# Patient Record
Sex: MALE | NOT HISPANIC OR LATINO | Employment: OTHER | URBAN - METROPOLITAN AREA
[De-identification: names, ages, dates, MRNs, and addresses within clinical notes are randomized per-mention and may not be internally consistent; named-entity substitution may affect disease eponyms.]

---

## 2017-01-03 ENCOUNTER — TELEPHONE (OUTPATIENT)
Dept: PALLIATIVE MEDICINE | Facility: CLINIC | Age: 27
End: 2017-01-03

## 2017-01-03 NOTE — TELEPHONE ENCOUNTER
Left message that we were calling for an update about how s/he was doing after the injection.  LM that if s/he has any problems or questions to call the nurse line at [phone].

## 2017-01-12 ENCOUNTER — RADIANT APPOINTMENT (OUTPATIENT)
Dept: RADIOLOGY | Facility: CLINIC | Age: 27
End: 2017-01-12
Attending: ANESTHESIOLOGY
Payer: OTHER MISCELLANEOUS

## 2017-01-12 ENCOUNTER — RADIOLOGY INJECTION OFFICE VISIT (OUTPATIENT)
Dept: PALLIATIVE MEDICINE | Facility: CLINIC | Age: 27
End: 2017-01-12
Payer: OTHER MISCELLANEOUS

## 2017-01-12 VITALS — SYSTOLIC BLOOD PRESSURE: 121 MMHG | HEART RATE: 91 BPM | OXYGEN SATURATION: 97 % | DIASTOLIC BLOOD PRESSURE: 73 MMHG

## 2017-01-12 DIAGNOSIS — G90.511 COMPLEX REGIONAL PAIN SYNDROME TYPE 1 OF RIGHT UPPER EXTREMITY: ICD-10-CM

## 2017-01-12 DIAGNOSIS — G90.521 COMPLEX REGIONAL PAIN SYNDROME TYPE 1 OF RIGHT LOWER EXTREMITY: Primary | ICD-10-CM

## 2017-01-12 PROCEDURE — 64510 N BLOCK STELLATE GANGLION: CPT | Mod: RT | Performed by: ANESTHESIOLOGY

## 2017-01-12 ASSESSMENT — PAIN SCALES - GENERAL: PAINLEVEL: EXTREME PAIN (8)

## 2017-01-12 NOTE — PROGRESS NOTES
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  DAYANNA Lazo referred him for a right stellate ganglion block. Options, benefits and risks were discussed with the patient including bleeding, infection, hoarseness, droopy face/eye, swallowing problems, no pain relief, seizure, stroke, paralysis, nerve injury, spinal cord injury, spinal headache, spinal fluid leak, coma, death. Questions were answered to his satisfaction and he agrees to proceed. Voluntary informed consent was obtained and signed.  The patient's medical history, medications, and allergies were reviewed and reconciled.    Vitals:  /69 mmHg  Pulse 88  Allergies reviewed:  Yes  Medications reviewed:  Yes  Pre-procedure pain score:  8/10    Examination revealed weakness and decreased light touch sensation in the median nerve distribution of the right hand.  Skin color, temperature, texture, and hair distribution were symmetric on the upper extremities and Allodynia was negative today.    PROCEDURE IN DETAIL:  After obtaining signed informed consent the patient was brought into the procedure suit and placed in a supine position on the procedure table. Patient's neck area was prepped and draped in the usual sterile fashion. The transverse process of C7 on the right side was identified under AP fluoroscopic guidance. Soft tissue of his neck were retracted using my left hand until palpation of transverse process was obtained. 1 ml of 1% lidocaine was injected at the needle entry point and needle tract using a 30 gauge 1 inch needle.     Then a 22 gauge 2 inch stellate needle was inserted and slowly  advanced under fluoroscopic guidance until bony contact was made. Lateral fluoroscopic guidance was also obtained to confirm the needle depth and position. After negative aspiration for heme and CSF, 1 ml of Omnipaque 300 contrast dye was injected. 9 ml of Omnipaque was wasted.  There was no vascular uptake, the contrast was spreading distally over the T1 vertebral body.  Then 10 ml of a combination of 5 ml of 0.2 % Ropivicaine and  5 ml of Lidocaine 2% was injected (total injectate volume 10 ml) slowly and incrementally with frequent intermittent aspirations. The needle was then flushed with lidocaine 1% as it was withdrawn, the patient's neck was cleansed and patient was brought to the recovery area.     In the recovery area patient reported 100% pain relief in the arm and hand at the time of discharge with a discharge pain score of 0/10 in the arm/hand.  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  Patient will continue monitoring progress and he was instructed to follow up with DAYANNA Lazo as scheduled.  The patient was then discharged in good condition with discharge instructions.    Follow-up includes:  -post-procedure nursing call at 1 week  -repeat stellate ganglion block in 2 weeks  -follow up in pain clinic as scheduled     Jayro Brand MD  [facility] [place]

## 2017-01-12 NOTE — NURSING NOTE
"Chief Complaint   Patient presents with     Pain       Initial /69 mmHg  Pulse 88 Estimated body mass index is 24.18 kg/(m^2) as calculated from the following:    Height as of 7/5/16: 1.77 m (5' 9.69\").    Weight as of 12/1/16: 75.751 kg (167 lb).  BP completed using cuff size: regular    Injection intake:    If this procedure is requiring IV sedation has patient been NPO for 6  Hours? NA    Is patient on coumadin, plavix or other prescribed blood thinner?   No    If patient is on coumadin was it held for 5 days?   NA    If patient is on plavix was it held for 7 days?    NA     Does patient take aspirin?  No    If this is for a cervical procedure and patient is on aspirin has it been held for 6 days?   NA    Any allergies to contrast dye, iodine, steroid and/or numbing medications?  NO    Is patient currently taking antibiotics or have an active infection?  NO    Does patient have a ? Yes       Is patient pregnant or breastfeeding?  Not Applicable    Are the vital signs normal?  Yes    Gwen Castro MA      "

## 2017-01-12 NOTE — MR AVS SNAPSHOT
After Visit Summary   1/12/2017    Denton Hines    MRN: [id number]           Patient Information     Date Of Birth          1990        Visit Information        Provider Department      1/12/2017 1:00 PM Jayro Johnson MD; [employer] [facility]        Care Instructions    [facility]   Procedure Discharge Instructions    Today you saw:    Dr. Jayro Brand     You had an:  Right Stellate ganglion block    Medications used:  Lidocaine   Bupivacaine   Dexamethasone Depo-medrol  Omnipaque  Ropivicaine   Kenalog   Omniscan   Normal saline    Stellate Ganglion Block:  You may have one or more of the following: difficulty swallowing, hoarseness, sore throat, mild swelling or bruising or the neck, drooping or redness of the eyelid on the side injected, a very small pupil on the side injected, weakness of the arm on the side injected.  These symptons should go away within 24 hours  If you experience chest pain, shortness of breath or problems breathing go to your local Emergency Room    Do not eat until the lump in your throat goes down (usually about 4 hours)  Do not drink hot drinks for the next 12 hours  You may take small sips of cool drinks or ice chips    You may have numbness in your upper extremity.  this may occur up to 6-8 hours after the procedure due to effect of the local anesthetic    Do not drive for 6 hours. The effect of the local anesthetic could slow your reflexes.     You may resume your regular activities after 24 hours    Avoid strenuous activity for the first 24 hours    You may shower, however avoid swimming, tub baths or hot tubs for 24 hours following your procedure    You may have a mild to moderate increase in pain for several days following the injection.    You may use ice packs for 10-15 minutes, 3 to 4 times a day at the injection site for comfort    Do not use heat to painful areas for 6 to 8 hours. This  will give the local anesthetic time to wear off and prevent you from accidentally burning your skin.     You may use anti-inflammatory medications (such as Ibuprofen or Aleve or Advil) or Tylenol for pain control if necessary    If you experience any of the following, call the pain center nursing line during work hours at [phone] or the after hours provider line at [phone]:  -Fever over 100 degree F  -Swelling, bleeding, redness, drainage, warmth at the injection site  -Progressive weakness or numbness in your legs or arms  -Loss of bowel or bladder function  -Unusual headache that is not relieved by Tylenol  -Unusual new onset of pain that is not improving      Phone #s:  Appointment line: [phone];  Nurse line: [phone]            Follow-ups after your visit        Your next 10 appointments already scheduled     Jan 16, 2017  1:15 PM   Return Visit with Lulu Fields, APRN CNP, [employer]   [facility] ([facility])    [address] [place] [address] [address]  [facility] [address] [phone]              Who to contact     If you have questions or need follow up information about today's clinic visit or your schedule please contact [facility] [place] [facility] directly at [phone].  Normal or non-critical lab and imaging results will be communicated to you by gestigonhart, letter or phone within 4 business days after the clinic has received the results. If you do not hear from us within 7 days, please contact the clinic through gestigonhart or phone. If you have a critical or abnormal lab result, we will notify you by phone as soon as possible.  Submit refill requests through [employer] or call your pharmacy and they will forward the refill request to us. Please allow 3 business days for your refill to be completed.          Additional Information About Your Visit        gestigon[employer] Information     [employer] gives you secure access to your electronic health record. If you  see a primary care provider, you can also send messages to your care team and make appointments. If you have questions, please call your primary care clinic.  If you do not have a primary care provider, please call [phone] and they will assist you.        Care EveryWhere ID     This is your Care EveryWhere ID. This could be used by other organizations to access your [place] medical records  [id number]        Your Vitals Were     Pulse Pulse Oximetry                91 97%           Blood Pressure from Last 3 Encounters:   01/12/17 121/73   12/27/16 134/78   12/01/16 161/99    Weight from Last 3 Encounters:   12/01/16 75.751 kg (167 lb)   11/03/16 73.936 kg (163 lb)   09/26/16 75.297 kg (166 lb)              Today, you had the following     No orders found for display       Primary Care Provider Office Phone # Fax #    [facility] [phone] [address] [facility] [address]        Thank you!     Thank you for choosing [facility]  for your care. Our goal is always to provide you with excellent care. Hearing back from our patients is one way we can continue to improve our services. Please take a few minutes to complete the written survey that you may receive in the mail after your visit with us. Thank you!             Your Updated Medication List - Protect others around you: Learn how to safely use, store and throw away your medicines at www.disposemymeds.org.          This list is accurate as of: 1/12/17  2:04 PM.  Always use your most recent med list.                   Brand Name Dispense Instructions for use    cyclobenzaprine 5 MG tablet    FLEXERIL    60 tablet    Take 1 tablet (5 mg) by mouth 3 times daily as needed for muscle spasms (If causes sedation just take at bedtime.)       IBUPROFEN PO      Take 1 tablet by mouth every 8 hours as needed for moderate pain       * lidocaine 5 % ointment    XYLOCAINE    100 g    Apply topically as needed for  moderate pain       * lidocaine 5 % Patch    LIDODERM    90 patch    1-3 patches daily as needed. Have patches on for 12 hours and off for 12 hours.       MAGNESIUM OXIDE PO          MULTIVITAMIN PO      Take by mouth daily       * order for DME     1 Units    Equipment being ordered: Neoprene or similar type of wrist/arm compression sleeve. Get largest size so it is not too tight.       * order for DME     1 Units    Equipment being ordered: TENS       * Notice:  This list has 4 medication(s) that are the same as other medications prescribed for you. Read the directions carefully, and ask your doctor or other care provider to review them with you.

## 2017-01-12 NOTE — NURSING NOTE
Discharge Information    IV Discontiued Time:  1408 catheter intact    Amount of Fluid Infused:  Saline locked    Discharge Criteria = When patient returns to baseline or as per MD order    Consciousness:  Pt is fully awake    Circulation:  BP +/- 20% of pre-procedure level    Respiration:  Patient is able to breathe deeply    O2 Sat:  Patient is able to maintain O2 Sat >92% on room air    Activity:  Moves 4 extremities on command    Ambulation:  Patient is able to stand and walk or stand and pivot into wheelchair    Dressing:  Clean/dry or No Dressing    Notes:   Discharge instructions and AVS given to patient    Patient meets criteria for discharge?  YES    Admitted to PCU?  No    Responsible adult present to accompany patient home?  Yes    Signature/Title:    andrés zee RN Care Coordinator  [facility]

## 2017-01-12 NOTE — PATIENT INSTRUCTIONS
[facility]   Procedure Discharge Instructions    Today you saw:    Dr. Jayro Brand     You had an:  Right Stellate ganglion block    Medications used:  Lidocaine   Bupivacaine   Dexamethasone Depo-medrol  Omnipaque  Ropivicaine   Kenalog   Omniscan   Normal saline    Stellate Ganglion Block:  You may have one or more of the following: difficulty swallowing, hoarseness, sore throat, mild swelling or bruising or the neck, drooping or redness of the eyelid on the side injected, a very small pupil on the side injected, weakness of the arm on the side injected.  These symptons should go away within 24 hours  If you experience chest pain, shortness of breath or problems breathing go to your local Emergency Room    Do not eat until the lump in your throat goes down (usually about 4 hours)  Do not drink hot drinks for the next 12 hours  You may take small sips of cool drinks or ice chips    You may have numbness in your upper extremity.  this may occur up to 6-8 hours after the procedure due to effect of the local anesthetic    Do not drive for 6 hours. The effect of the local anesthetic could slow your reflexes.     You may resume your regular activities after 24 hours    Avoid strenuous activity for the first 24 hours    You may shower, however avoid swimming, tub baths or hot tubs for 24 hours following your procedure    You may have a mild to moderate increase in pain for several days following the injection.    You may use ice packs for 10-15 minutes, 3 to 4 times a day at the injection site for comfort    Do not use heat to painful areas for 6 to 8 hours. This will give the local anesthetic time to wear off and prevent you from accidentally burning your skin.     You may use anti-inflammatory medications (such as Ibuprofen or Aleve or Advil) or Tylenol for pain control if necessary    If you experience any of the following, call the pain center nursing line during work hours at [phone]  or the after hours provider line at [phone]:  -Fever over 100 degree F  -Swelling, bleeding, redness, drainage, warmth at the injection site  -Progressive weakness or numbness in your legs or arms  -Loss of bowel or bladder function  -Unusual headache that is not relieved by Tylenol  -Unusual new onset of pain that is not improving      Phone #s:  Appointment line: [phone];  Nurse line: [phone]

## 2017-01-18 ENCOUNTER — OFFICE VISIT (OUTPATIENT)
Dept: PALLIATIVE MEDICINE | Facility: CLINIC | Age: 27
End: 2017-01-18
Payer: OTHER MISCELLANEOUS

## 2017-01-18 VITALS
OXYGEN SATURATION: 98 % | RESPIRATION RATE: 18 BRPM | SYSTOLIC BLOOD PRESSURE: 158 MMHG | HEART RATE: 101 BPM | DIASTOLIC BLOOD PRESSURE: 92 MMHG

## 2017-01-18 DIAGNOSIS — G56.41 COMPLEX REGIONAL PAIN SYNDROME TYPE 2 OF RIGHT UPPER EXTREMITY: Primary | ICD-10-CM

## 2017-01-18 PROCEDURE — 99214 OFFICE O/P EST MOD 30 MIN: CPT | Performed by: NURSE PRACTITIONER

## 2017-01-18 PROCEDURE — T1013 SIGN LANG/ORAL INTERPRETER: HCPCS | Mod: U3 | Performed by: NURSE PRACTITIONER

## 2017-01-18 ASSESSMENT — PAIN SCALES - GENERAL: PAINLEVEL: MODERATE PAIN (5)

## 2017-01-18 NOTE — Clinical Note
RE: Denton Hines    Recommend membership to gym or athletic club for execise/fitness which will contribute to his overall health and wellness.    DAYANNA Rashid, NP-C  [facility]

## 2017-01-18 NOTE — PATIENT INSTRUCTIONS
After Visit Instructions:     Thank you for coming to [facility] for your care. It is my goal to partner with you to help you reach your optimal state of health.     I am recommending multidisciplinary care at this time.  The focus of care will be to continue gradual rehabilitation and pain management with medication adjustments as needed.    Continue daily self-care, identifying contributing factors, and monitoring variations in pain level. Continue to integrate self-care into your life.      1. Schedule hand therapy visits  2. Schedule follow-up with DAYANNA Lazo, NPOSMEL in 6-8 weeks  3. Procedures recommended: Continue stellate ganglion blocks every 2 weeks or as Dr Anais Brand recommends.  4. Medication recommendations: No changes at this time.       DAYANNA Rashid, NP-C  [facility]    Contact information: [facility]    Please call if any side effects, questions, or concerns arise.    Nurse Triage line:  [phone]   Call this number with any questions or concerns. You may leave a detailed message anytime. Calls are typically returned Monday through Friday between 8 AM and 4:30 PM. We usually get back to you within 2 business days depending on the issue/request.    Scheduling number: [phone].  Call this number to schedule or change appointments.          Medication Refills Policy:    For non-narcotic medications, please your pharmacy directly to request a refill and the pharmacy will call the [facility] Management Center for authorization. Please allow 3-4 days for these refills.    For narcotic refills, call the nurse triage line and leave a message requesting your refill along with the name of the pharmacy that you use. Narcotic prescriptions will be mailed to your pharmacy or you may pick them up at the clinic.  Please call 7-10 days before your refill is due  The above policy allows adequate time so that you do not  run out of medication.    No Show - Late Cancellation - Late Arrival Policy  We believe regular attendance is key to your success in our program.    Any time you are unable to keep your appointment we ask that you call us at  least 24 hours in advance to let us know. This will allow us to offer the appointment time to another patient. The following is our policy for missed appointments. This also applies to appointments cancelled with less than 24 hours notice.    You will receive a letter after missing your 1st and 2nd appointments without contacting the clinic before your scheduled appointment time.     After missing 3 appointments without calling first, we will cancel all of your future appointments at [facility].    At that point, you will not be able to resume services unless approved by your care team  We understand that unforseen circumstances arise, however, out of respect for all concerned and to provide this appointment to another patient, this policy will be enforced.    Please note that most follow up appointments are 30 minutes long. If you arrive late, your provider may not be able to see you for the entire 30 minutes. Please also note that if you arrive more than 15 minutes for any appointment, it may be rescheduled.

## 2017-01-18 NOTE — MR AVS SNAPSHOT
After Visit Summary   1/18/2017    Denton Hines    MRN: [id number]           Patient Information     Date Of Birth          1990        Visit Information        Provider Department      1/18/2017 11:30 AM Dany Alvarez Dawn Marie, APRN CNP [facility]        Care Instructions    After Visit Instructions:     Thank you for coming to [facility] for your care. It is my goal to partner with you to help you reach your optimal state of health.     I am recommending multidisciplinary care at this time.  The focus of care will be to continue gradual rehabilitation and pain management with medication adjustments as needed.    Continue daily self-care, identifying contributing factors, and monitoring variations in pain level. Continue to integrate self-care into your life.      1. Schedule hand therapy visits  2. Schedule follow-up with DAYANNA Lazo, NP-C in 6-8 weeks  3. Procedures recommended: Continue stellate ganglion blocks every 2 weeks or as Dr Anais Brand recommends.  4. Medication recommendations: No changes at this time.       DAYANNA Rashid, NP-C  [facility]    Contact information: [facility]    Please call if any side effects, questions, or concerns arise.    Nurse Triage line:  [phone]   Call this number with any questions or concerns. You may leave a detailed message anytime. Calls are typically returned Monday through Friday between 8 AM and 4:30 PM. We usually get back to you within 2 business days depending on the issue/request.    Scheduling number: [phone].  Call this number to schedule or change appointments.          Medication Refills Policy:    For non-narcotic medications, please your pharmacy directly to request a refill and the pharmacy will call the [facility] [place] for authorization. Please allow 3-4 days for these refills.    For  narcotic refills, call the nurse triage line and leave a message requesting your refill along with the name of the pharmacy that you use. Narcotic prescriptions will be mailed to your pharmacy or you may pick them up at the clinic.  Please call 7-10 days before your refill is due  The above policy allows adequate time so that you do not run out of medication.    No Show - Late Cancellation - Late Arrival Policy  We believe regular attendance is key to your success in our program.    Any time you are unable to keep your appointment we ask that you call us at  least 24 hours in advance to let us know. This will allow us to offer the appointment time to another patient. The following is our policy for missed appointments. This also applies to appointments cancelled with less than 24 hours notice.    You will receive a letter after missing your 1st and 2nd appointments without contacting the clinic before your scheduled appointment time.     After missing 3 appointments without calling first, we will cancel all of your future appointments at [facility].    At that point, you will not be able to resume services unless approved by your care team  We understand that unforseen circumstances arise, however, out of respect for all concerned and to provide this appointment to another patient, this policy will be enforced.    Please note that most follow up appointments are 30 minutes long. If you arrive late, your provider may not be able to see you for the entire 30 minutes. Please also note that if you arrive more than 15 minutes for any appointment, it may be rescheduled.                                   Follow-ups after your visit        Who to contact     If you have questions or need follow up information about today's clinic visit or your schedule please contact [facility] directly at [phone].  Normal or non-critical lab and imaging results will be communicated to you by  MyChart, letter or phone within 4 business days after the clinic has received the results. If you do not hear from us within 7 days, please contact the clinic through [employer] or phone. If you have a critical or abnormal lab result, we will notify you by phone as soon as possible.  Submit refill requests through [employer] or call your pharmacy and they will forward the refill request to us. Please allow 3 business days for your refill to be completed.          Additional Information About Your Visit        [employer] Information     [employer] gives you secure access to your electronic health record. If you see a primary care provider, you can also send messages to your care team and make appointments. If you have questions, please call your primary care clinic.  If you do not have a primary care provider, please call [phone] and they will assist you.        Care EveryWhere ID     This is your Care EveryWhere ID. This could be used by other organizations to access your [place] medical records  [id number]        Your Vitals Were     Pulse Respirations Pulse Oximetry             101 18 98%          Blood Pressure from Last 3 Encounters:   01/18/17 158/92   01/12/17 121/73   12/27/16 134/78    Weight from Last 3 Encounters:   12/01/16 75.751 kg (167 lb)   11/03/16 73.936 kg (163 lb)   09/26/16 75.297 kg (166 lb)              Today, you had the following     No orders found for display       Primary Care Provider Office Phone # Fax #    [facility] [phone] [address] [address]        Thank you!     Thank you for choosing [facility]  for your care. Our goal is always to provide you with excellent care. Hearing back from our patients is one way we can continue to improve our services. Please take a few minutes to complete the written survey that you may receive in the mail after your visit with us. Thank you!             Your Updated Medication  List - Protect others around you: Learn how to safely use, store and throw away your medicines at www.disposemymeds.org.          This list is accurate as of: 1/18/17 12:00 PM.  Always use your most recent med list.                   Brand Name Dispense Instructions for use    cyclobenzaprine 5 MG tablet    FLEXERIL    60 tablet    Take 1 tablet (5 mg) by mouth 3 times daily as needed for muscle spasms (If causes sedation just take at bedtime.)       IBUPROFEN PO      Take 1 tablet by mouth every 8 hours as needed for moderate pain       * lidocaine 5 % ointment    XYLOCAINE    100 g    Apply topically as needed for moderate pain       * lidocaine 5 % Patch    LIDODERM    90 patch    1-3 patches daily as needed. Have patches on for 12 hours and off for 12 hours.       MAGNESIUM OXIDE PO          MULTIVITAMIN PO      Take by mouth daily       * order for DME     1 Units    Equipment being ordered: Neoprene or similar type of wrist/arm compression sleeve. Get largest size so it is not too tight.       * order for DME     1 Units    Equipment being ordered: TENS       * Notice:  This list has 4 medication(s) that are the same as other medications prescribed for you. Read the directions carefully, and ask your doctor or other care provider to review them with you.

## 2017-01-18 NOTE — PROGRESS NOTES
"[facility]    CHIEF COMPLAINT: Right arm pain    INTERVAL HISTORY:  Last seen on 12/1/16.        Recommendations/plan at the last visit included:  1. Schedule follow-up with DAYANNA Lazo NP-C in 4-6 weeks  1. Procedures recommended: continue Stellate Ganglion blocks with Dr Anais Brand   2. Medication recommendations: Cyclobenzaprine 5 mg tablet. 1 tablet up to three times per day. If it makes you too sleepy, just take at bedtime.    Since his last visit, Meekchintan Celestino Garcia reports:  - Has had 2 Stellate ganglion blocks since last visit. Some improvement noted with each block although not terribly long-lasting. He continues to go to hand therapy as well    Pain Information:   Pain quality: Burning, Numb, Sharp, Stabbing and Tender    Pain timing: Pain is worst at night     Pain rating: intensity ranges from 1/10 to 10/10, and averages 6/10 on a 0-10 scale.   Aggravating factors include: \"Using the arm, activities, cold\"   Relieving factors include: \"Hot packs and injections\"      Annual Controlled Substance Agreement/UDS due date: n/a    CURRENT RELEVANT PAIN MEDICATIONS:   OTC tylenol or ibuprofen, rare use.   Flexeril 5 mg t.i.d. P.r.n.  Lidocaine patch    Patient is using the medication as prescribed:  Yes  Is your medication helpful? Yes  Medication side effects? No    Annual Controlled Substance Agreement/UDS due date: N/A, no opiates prescribed.     Previous Pain Relevant Medications: (H--helped; HI--Helped initially; SWH--Somewhat helpful; NH--No help; W--worse; SE--side effects; ?--Unsure if helpful)   NOTE: This medication information taken from patient's intake form, not medical records.    Opiates: Hydrocodone: H, Oxycodone: H   NSAIDS: Ibuprofen: H for headaches    Muscle Relaxants: none   Anti-migraine mediations: none   Anti-depressants: none   Sleep aids: none   Anti-convulsants: Gabapentin: NH, Sedation    Topicals: none   Other medications not covered above: non    Any " illicit drug use: denies  Any use of prescriptions other than how they were prescribed:denies  [place] Board of Pharmacy Data Base Reviewed:    YES; No concern for abuse or misuse of controlled medications based on this report.     SELF CARE:   How often do you practice SELF-CARE (relaxing, stretching, pacing, monitoring posture, taking mini-breaks) in a typical day:  2-3x per week        Medications:  Current Outpatient Prescriptions   Medication Sig Dispense Refill     cyclobenzaprine (FLEXERIL) 5 MG tablet Take 1 tablet (5 mg) by mouth 3 times daily as needed for muscle spasms (If causes sedation just take at bedtime.) 60 tablet 1     lidocaine (LIDODERM) 5 % patch 1-3 patches daily as needed. Have patches on for 12 hours and off for 12 hours. 90 patch 0     lidocaine (XYLOCAINE) 5 % ointment Apply topically as needed for moderate pain 100 g 3     IBUPROFEN PO Take 1 tablet by mouth every 8 hours as needed for moderate pain       order for DME Equipment being ordered: TENS 1 Units 0     MAGNESIUM OXIDE PO        order for DME Equipment being ordered: Neoprene or similar type of wrist/arm compression sleeve. Get largest size so it is not too tight. 1 Units 0     Multiple Vitamins-Minerals (MULTIVITAMIN PO) Take by mouth daily         Review of Systems: A 10-point review of systems was negative, with the exception of chronic pain issues.      Social History: Reviewed; unchanged from initial consultation.     Family history: Reviewed; unchanged from initial consultation.     PHYSICAL EXAM    Filed Vitals:    01/18/17 1133   BP: 158/92   Pulse: 101   Resp: 18   SpO2: 98%       Constitutional: healthy, alert and no distress but frustrated, somewhat depressed today  HEENT: Head atraumatic, normocephalic. Eyes without conjunctival injection or jaundice. Neck supple. No obvious neck masses.  Skin: No rash, lesions, or petechiae of exposed skin.   Extremities: Peripheral pulses intact. RUE slightly cooler to the touch  and more red in color as compared to LUE. Skin on right hand noticeably more dry and rough and left hand.  Psychiatric/mental status: Alert, without lethargy or stupor. Appropriate affect. Mood normal.   Neurologic exam:  CN:  Cranial nerves 2-12 are grossly normal.  Motor:  5/5 LUE and 1/5 hand strength    DIRE Score for ongoing opioid management is calculated as follows:    Diagnosis = 2 pts (slowly progressive; moderate pain/objective findings)    Intractability = 3 pts (patient fully engaged but inadequate response to treatments)    Risk        Psych = 3 pts (no significant personality dysfunction/mental illness; good communication with clinic)         Chem Hlth = 3 pts (no history of chemical dependency; not drug-focused)       Reliability = 3 pts (highly reliable with meds, appointments, treatments)       Social = 3 pts (supportive family/close relationships; involved in work/school; no isolation) Unable to work due to medical condition but otherwise fully engaged.        (Psych + Chem hlth + Reliability + Social) = 17    Efficacy = 2 pts (moderate benefit/function; low med dose; too early/not tried meds)      DIRE Score = 19 Patient is not interested in opioid analgesia.         7-13: likely NOT suitable candidate for long-term opioid analgesia       14-21: may be a suitable candidate for long-term opioid analgesia     DIAGNOSTIC TESTS:  Imaging Studies:   No new imaging    Assessment:   1.  CRPS of right upper extremity.   2.  Right shoulder strain secondary to positioning related to CRPS    Denton presents in the company of an  and his [facility] Rep. Luis Mcallister. He is quite frustrated and depressed relating to the chronic nature of his pain. A lot of discussion regarding whether or not he should continue block injections and therapies. I explained that unfortunately we don't have much else to offer to treat CRPS. Medications are available. The try but he has not had good experience with gabapentin,  Lyrica and is not interested in opioid medications. He does request a letter regarding the benefit of a gym membership which I have provided so that his workman's comp will pay for this membership cost.    Plan:    Diagnosis reviewed, treatment option addressed, and risk/benifits discussed.  Self-care instructions given.  I am recommending a multidisciplinary treatment plan to help this patient better manage pain.      3. Schedule hand therapy visits  4. Schedule follow-up with DAYANNA Lazo, NP-C in 6-8 weeks  5. Procedures recommended: Continue stellate ganglion blocks every 2 weeks or as Dr Anais Brand recommends.  6. Medication recommendations: No changes at this time.       Total time spent face to face was 30 minutes and more than 50% of face to face time was spent in counseling and/or coordination of care regarding the diagnosis and recommendations above.      DAYANNA Rashid, NP-C   [facility]

## 2017-01-20 ENCOUNTER — TELEPHONE (OUTPATIENT)
Dept: PALLIATIVE MEDICINE | Facility: CLINIC | Age: 27
End: 2017-01-20

## 2017-01-20 NOTE — TELEPHONE ENCOUNTER
Information noted. Likely reaction to injection.  If symptoms continue have him call and let us know.    Jayro Brand MD  [facility]

## 2017-01-20 NOTE — TELEPHONE ENCOUNTER
I called Denton and shared the information below. He is scheduled for another injection with Dr. Anais Brand on 02/07/17.     SHANIA SwainN, RN  Care Coordinator  [facility]

## 2017-01-20 NOTE — TELEPHONE ENCOUNTER
Patient had a Stellate ganglion block at right C7 injection on 1/125/17.  Called patient for an update.      Pt reported the following details:  Has pain relief in hand and better color. Also reported he got a bad cough and pain in his larynx two days after procedure.   Told patient that the information will be forwarded to her provider.  Also explained that, if a steroid medication was used, it could take up to 14 days to feel the full effect and if pt has any further questions or concerns pt should call the nurse line at [phone].

## 2017-01-30 ENCOUNTER — TELEPHONE (OUTPATIENT)
Dept: PALLIATIVE MEDICINE | Facility: CLINIC | Age: 27
End: 2017-01-30

## 2017-01-30 NOTE — TELEPHONE ENCOUNTER
Will route to Dr Anais Brand, who placed the block injection for his thoughts. I think the eye discharge is unlikely to be from injection. Certainly could be developing sensitivity to steroids.     DAYANNA Rashid, NP-C  [facility]

## 2017-01-30 NOTE — TELEPHONE ENCOUNTER
Patient called wanting to speak with Lulu about some things he is experiencing after injection. He would like a call back ASAP.     Sydni Wood    [facility]

## 2017-01-30 NOTE — TELEPHONE ENCOUNTER
Yellowing of the eyes would not be a reaction to the injection and the most recent injection was only with local anesthetic. He should probably be checked for hepatitis or other hepatic problem.    Jayro Brand MD  [facility]

## 2017-01-30 NOTE — TELEPHONE ENCOUNTER
I spoke with Denton and he reports 2 concerns. He continues to cough after is injection. He has a yellowing of the whites of his eyes. He reports he was seen by his PCP related to these thing. He reports that a chest X ray came back clear and his PCP referred him to his opthomologist to have his eyes examined. He has not scheduled his appointment yet and  He is wondering if these symptoms are caused by his injections. He reports his PCP told him he may be having too many injections or developing an allergy to the medication used in his injections.     SHANIA SwainN, RN  Care Coordinator  [facility]

## 2017-01-31 NOTE — TELEPHONE ENCOUNTER
Contact patient with Dr Anais Brand's response. Patient should be evaluated by PCP re: yellowing of eyes asap.   DAYANNA Rashid, NP-C  [facility]

## 2017-01-31 NOTE — TELEPHONE ENCOUNTER
I spoke to Rachintan on the phone and provided the information and recommendation from Dr. Anais Brand. He understood and had no further questions at this time.     SHANIA SwainN, RN  Care Coordinator  [facility]      .

## 2017-02-07 ENCOUNTER — RADIOLOGY INJECTION OFFICE VISIT (OUTPATIENT)
Dept: PALLIATIVE MEDICINE | Facility: CLINIC | Age: 27
End: 2017-02-07
Payer: OTHER MISCELLANEOUS

## 2017-02-07 ENCOUNTER — RADIANT APPOINTMENT (OUTPATIENT)
Dept: RADIOLOGY | Facility: CLINIC | Age: 27
End: 2017-02-07
Attending: ANESTHESIOLOGY
Payer: COMMERCIAL

## 2017-02-07 VITALS — OXYGEN SATURATION: 96 % | HEART RATE: 82 BPM | DIASTOLIC BLOOD PRESSURE: 86 MMHG | SYSTOLIC BLOOD PRESSURE: 132 MMHG

## 2017-02-07 DIAGNOSIS — G56.40 COMPLEX REGIONAL PAIN SYNDROME TYPE 2, AFFECTING UNSPECIFIED SITE: ICD-10-CM

## 2017-02-07 DIAGNOSIS — G56.41 COMPLEX REGIONAL PAIN SYNDROME TYPE 2 OF RIGHT UPPER EXTREMITY: Primary | ICD-10-CM

## 2017-02-07 PROCEDURE — 64510 N BLOCK STELLATE GANGLION: CPT | Mod: RT | Performed by: ANESTHESIOLOGY

## 2017-02-07 ASSESSMENT — PAIN SCALES - GENERAL
PAINLEVEL: MILD PAIN (3)
PAINLEVEL: EXTREME PAIN (8)

## 2017-02-07 NOTE — PATIENT INSTRUCTIONS
[facility]   Procedure Discharge Instructions    Today you saw:  Dr. Jayro Brand    You had an:  Stellate Ganglion Block:  You may have one or more of the following: difficulty swallowing, hoarseness, sore throat, mild swelling or bruising or the neck, drooping or redness of the eyelid on the side injected, a very small pupil on the side injected, weakness of the arm on the side injected.  These symptons should go away within 24 hours  If you experience chest pain, shortness of breath or problems breathing go to your local Emergency Room    Do not eat until the lump in your throat goes down (usually about 4 hours)  Do not drink hot drinks for the next 12 hours  You may take small sips of cool drinks or ice chips  Use a sling to protect your arm if needed    Medications used:  Lidocaine Ropivicaine   Omnipaque           Be cautious when walking. Numbness and/or weakness in the lower extremities may occur up to 6-8 hours after the procedure due to effect of the local anesthetic    Do not drive for 6 hours. The effect of the local anesthetic could slow your reflexes.     You may resume your regular activities after 24 hours    Avoid strenuous activity for the first 24 hours    You may shower, however avoid swimming, tub baths or hot tubs for 24 hours following your procedure    You may have a mild to moderate increase in pain for several days following the injection.       You may use ice packs for 10-15 minutes, 3 to 4 times a day at the injection site for comfort    Do not use heat to painful areas for 6 to 8 hours. This will give the local anesthetic time to wear off and prevent you from accidentally burning your skin.     You may use anti-inflammatory medications (such as Ibuprofen or Aleve or Advil) or Tylenol for pain control if necessary    If you were fasting, you may resume your normal diet and medications after the procedure    If you experience any of the following, call the pain  center nursing line during work hours at [phone] or the after hours provider line at [phone]:  -Fever over 100 degree F  -Swelling, bleeding, redness, drainage, warmth at the injection site  -Progressive weakness or numbness in your legs or arms  -Loss of bowel or bladder function  -Unusual headache that is not relieved by Tylenol  -Unusual new onset of pain that is not improving      Phone #s:  Appointment line: [phone];  Nurse line: [phone]

## 2017-02-07 NOTE — PROGRESS NOTES
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  DAYANNA Lazo referred him for a right stellate ganglion block. Options, benefits and risks were discussed with the patient including bleeding, infection, hoarseness, droopy face/eye, swallowing problems, no pain relief, seizure, stroke, paralysis, nerve injury, spinal cord injury, spinal headache, spinal fluid leak, coma, death. Questions were answered to his satisfaction and he agrees to proceed. Voluntary informed consent was obtained and signed.  The patient's medical history, medications, and allergies were reviewed and reconciled.    Vitals:  /86 mmHg  Pulse 82  SpO2 96%  Allergies reviewed:  Yes  Medications reviewed:  Yes  Pre-procedure pain score:  8/10    Examination revealed weakness and decreased light touch sensation in the median nerve distribution of the right hand.  Skin color, temperature, texture, and hair distribution were symmetric on the upper extremities and Allodynia was negative today.  He did complain of dysesthesias with light touch of the right palmar incision scar.    PROCEDURE IN DETAIL:  After obtaining signed informed consent the patient was brought into the procedure suit and placed in a supine position on the procedure table. Patient's neck area was prepped and draped in the usual sterile fashion. The transverse process of C7 on the right side was identified under AP fluoroscopic guidance. Soft tissue of his neck were retracted using my left hand until palpation of transverse process was obtained. 1 ml of 1% lidocaine was injected at the needle entry point and needle tract using a  30 gauge 1 inch needle.     Then a 22 gauge 2 inch stellate needle was inserted and slowly advanced under fluoroscopic guidance until bony contact was made. Lateral fluoroscopic guidance was also obtained to confirm the needle depth and position. After negative aspiration for heme and CSF, 1 ml of Omnipaque 300 contrast dye was injected. 9 ml of Omnipaque was wasted.  There was no vascular uptake, the contrast was spreading distally over the T1 vertebral body.  A test dose was performed by injecting 3 ml of Lidocaine 1% which was negative for metallic taste, tinnitus, or other complaints.  Then 8 ml of 0.2 % Ropivicaine was injected slowly and incrementally with frequent intermittent aspirations. The needle was then flushed with lidocaine 1% as it was withdrawn, the patient's neck was cleansed and patient was brought to the recovery area.     In the recovery area patient reported 62.5% pain relief in the arm and hand at the time of discharge with a discharge pain score of 3/10 in the arm/hand.  Patient had Perla syndrome and hyperemia and increased temperature in the right hand which is an indication of a successful stellate ganglion block.  Patient will continue monitoring progress and he was instructed to follow up with DAYANNA Lazo as scheduled.  The patient was then discharged in good condition with discharge instructions.    Follow-up includes:  -post-procedure nursing call at 1 week  -repeat stellate ganglion block in 2 weeks  -follow up in pain clinic as scheduled     Jayro Brand MD  [facility]

## 2017-02-07 NOTE — NURSING NOTE
"Chief Complaint   Patient presents with     Pain     Right hand pain        Initial /75 mmHg  Pulse 100 Estimated body mass index is 24.18 kg/(m^2) as calculated from the following:    Height as of 7/5/16: 1.77 m (5' 9.69\").    Weight as of 12/1/16: 75.751 kg (167 lb).  Medication Reconciliation: complete     Injection intake:    If this procedure is requiring IV sedation has patient been NPO for 6  Hours? NA    Is patient on coumadin, plavix or other prescribed blood thinner?   No    If patient is on coumadin was it held for 5 days?   NA    If patient is on plavix was it held for 7 days?    NA     Does patient take aspirin?  No    If this is for a cervical procedure and patient is on aspirin has it been held for 6 days?   NA    Any allergies to contrast dye, iodine, steroid and/or numbing medications?  NO    Is patient currently taking antibiotics or have an active infection?  NO    Does patient have a ? Yes       Is patient pregnant or breastfeeding?  Not Applicable    Are the vital signs normal?  Yes    Gwen Castro MA      "

## 2017-02-07 NOTE — NURSING NOTE
Discharge Information    IV Discontiued Time:  1153 catheter intact    Amount of Fluid Infused:  NA    Discharge Criteria = When patient returns to baseline or as per MD order    Consciousness:  Pt is fully awake    Circulation:  BP +/- 20% of pre-procedure level    Respiration:  Patient is able to breathe deeply    O2 Sat:  Patient is able to maintain O2 Sat >92% on room air    Activity:  Moves 4 extremities on command    Ambulation:  Patient is able to stand and walk or stand and pivot into wheelchair    Dressing:  Clean/dry or No Dressing    Notes:   Discharge instructions and AVS given to patient    Patient meets criteria for discharge?  YES    Admitted to PCU?  No    Responsible adult present to accompany patient home?  Yes    Signature/Title:    Cindi Sethi RN Care Coordinator  [facility]

## 2017-02-07 NOTE — MR AVS SNAPSHOT
After Visit Summary   2/7/2017    Denton Hines    MRN: [id number]           Patient Information     Date Of Birth          1990        Visit Information        Provider Department      2/7/2017 10:30 AM Jayro Johnson MD; [employer] [facility] [place]        Care Instructions    [facility]   Procedure Discharge Instructions    Today you saw:  Dr. Jayro Brand    You had an:  Stellate Ganglion Block:  You may have one or more of the following: difficulty swallowing, hoarseness, sore throat, mild swelling or bruising or the neck, drooping or redness of the eyelid on the side injected, a very small pupil on the side injected, weakness of the arm on the side injected.  These symptons should go away within 24 hours  If you experience chest pain, shortness of breath or problems breathing go to your local Emergency Room    Do not eat until the lump in your throat goes down (usually about 4 hours)  Do not drink hot drinks for the next 12 hours  You may take small sips of cool drinks or ice chips  Use a sling to protect your arm if needed    Medications used:  Lidocaine Ropivicaine   Omnipaque           Be cautious when walking. Numbness and/or weakness in the lower extremities may occur up to 6-8 hours after the procedure due to effect of the local anesthetic    Do not drive for 6 hours. The effect of the local anesthetic could slow your reflexes.     You may resume your regular activities after 24 hours    Avoid strenuous activity for the first 24 hours    You may shower, however avoid swimming, tub baths or hot tubs for 24 hours following your procedure    You may have a mild to moderate increase in pain for several days following the injection.       You may use ice packs for 10-15 minutes, 3 to 4 times a day at the injection site for comfort    Do not use heat to painful areas for 6 to 8 hours. This will give the local anesthetic time to  wear off and prevent you from accidentally burning your skin.     You may use anti-inflammatory medications (such as Ibuprofen or Aleve or Advil) or Tylenol for pain control if necessary    If you were fasting, you may resume your normal diet and medications after the procedure    If you experience any of the following, call the pain center nursing line during work hours at [phone] or the after hours provider line at [phone]:  -Fever over 100 degree F  -Swelling, bleeding, redness, drainage, warmth at the injection site  -Progressive weakness or numbness in your legs or arms  -Loss of bowel or bladder function  -Unusual headache that is not relieved by Tylenol  -Unusual new onset of pain that is not improving      Phone #s:  Appointment line: [phone];  Nurse line: [phone]            Follow-ups after your visit        Your next 10 appointments already scheduled     Feb 21, 2017 10:45 AM   Radiology Injections with Jayro Brand MD   [facility] [place] ([facility] [place])    [address]  [place] [address] [phone]            Mar 02, 2017  1:00 PM   Return Visit with DAYANNA Wilkins CNP   [facility] ([facility])    [address]  [facility] [address]  [facility] [address] [phone]            Mar 14, 2017 11:00 AM   Radiology Injections with Jayro Brand MD   [facility] [place] ([facility] [place])    [address] [place] [address] [phone]            Mar 28, 2017 11:00 AM   Radiology Injections with Jayro Brand MD   [facility] [place] ([facility] [place])    [address] [place] [address] [phone]              Who to contact     If you have questions or need follow up information about today's clinic visit or your schedule please contact [facility] [place] directly at [phone].  Normal or  non-critical lab and imaging results will be communicated to you by MyChart, letter or phone within 4 business days after the clinic has received the results. If you do not hear from us within 7 days, please contact the clinic through [employer] or phone. If you have a critical or abnormal lab result, we will notify you by phone as soon as possible.  Submit refill requests through [employer] or call your pharmacy and they will forward the refill request to us. Please allow 3 business days for your refill to be completed.          Additional Information About Your Visit        [employer] Information     [employer] gives you secure access to your electronic health record. If you see a primary care provider, you can also send messages to your care team and make appointments. If you have questions, please call your primary care clinic.  If you do not have a primary care provider, please call [phone] and they will assist you.        Care EveryWhere ID     This is your Care EveryWhere ID. This could be used by other organizations to access your [facility] medical records  [id number]        Your Vitals Were     Pulse                   100            Blood Pressure from Last 3 Encounters:   02/07/17 127/75   01/18/17 158/92   01/12/17 121/73    Weight from Last 3 Encounters:   12/01/16 75.751 kg (167 lb)   11/03/16 73.936 kg (163 lb)   09/26/16 75.297 kg (166 lb)              Today, you had the following     No orders found for display       Primary Care Provider Office Phone # Fax #    [facility] [phone] [address]  [facility] [address]        Thank you!     Thank you for choosing [facility]  for your care. Our goal is always to provide you with excellent care. Hearing back from our patients is one way we can continue to improve our services. Please take a few minutes to complete the written survey that you may receive in the mail after your visit with us. Thank you!              Your Updated Medication List - Protect others around you: Learn how to safely use, store and throw away your medicines at www.disposemymeds.org.          This list is accurate as of: 2/7/17 11:35 AM.  Always use your most recent med list.                   Brand Name Dispense Instructions for use    cyclobenzaprine 5 MG tablet    FLEXERIL    60 tablet    Take 1 tablet (5 mg) by mouth 3 times daily as needed for muscle spasms (If causes sedation just take at bedtime.)       IBUPROFEN PO      Take 1 tablet by mouth every 8 hours as needed for moderate pain       * lidocaine 5 % ointment    XYLOCAINE    100 g    Apply topically as needed for moderate pain       * lidocaine 5 % Patch    LIDODERM    90 patch    1-3 patches daily as needed. Have patches on for 12 hours and off for 12 hours.       MAGNESIUM OXIDE PO          MULTIVITAMIN PO      Take by mouth daily       * order for DME     1 Units    Equipment being ordered: Neoprene or similar type of wrist/arm compression sleeve. Get largest size so it is not too tight.       * order for DME     1 Units    Equipment being ordered: TENS       * Notice:  This list has 4 medication(s) that are the same as other medications prescribed for you. Read the directions carefully, and ask your doctor or other care provider to review them with you.

## 2017-02-17 ENCOUNTER — OFFICE VISIT (OUTPATIENT)
Dept: PALLIATIVE MEDICINE | Facility: CLINIC | Age: 27
End: 2017-02-17
Payer: OTHER MISCELLANEOUS

## 2017-02-17 VITALS
HEART RATE: 105 BPM | SYSTOLIC BLOOD PRESSURE: 157 MMHG | RESPIRATION RATE: 16 BRPM | OXYGEN SATURATION: 97 % | DIASTOLIC BLOOD PRESSURE: 95 MMHG

## 2017-02-17 DIAGNOSIS — G56.41 COMPLEX REGIONAL PAIN SYNDROME TYPE 2 OF RIGHT UPPER EXTREMITY: Primary | ICD-10-CM

## 2017-02-17 PROCEDURE — 99214 OFFICE O/P EST MOD 30 MIN: CPT | Performed by: NURSE PRACTITIONER

## 2017-02-17 ASSESSMENT — PAIN SCALES - GENERAL: PAINLEVEL: SEVERE PAIN (7)

## 2017-02-17 NOTE — PATIENT INSTRUCTIONS
After Visit Instructions:     Thank you for coming to [facility] for your care. It is my goal to partner with you to help you reach your optimal state of health.     I am recommending multidisciplinary care at this time.  The focus of care will be to continue gradual rehabilitation and pain management with medication adjustments as needed.    Continue daily self-care, identifying contributing factors, and monitoring variations in pain level. Continue to integrate self-care into your life.      1. Schedule hand therapy visits  2. Schedule follow-up with DAYANNA Lazo NP-C in 6 weeks  3. Procedures recommended: Continue block injections with Dr Anais Brand.   Interventional procedures  are done at our [place] and [place] locations.   4. Medication recommendations: No changes at this time.     DAYANNA Rashid, NP-C  [facility]    Contact information: [facility]    Please call if any side effects, questions, or concerns arise.    Nurse Triage line:  [phone]   Call this number with any questions or concerns. You may leave a detailed message anytime. Calls are typically returned Monday through Friday between 8 AM and 4:30 PM. We usually get back to you within 2 business days depending on the issue/request.    Scheduling number: [phone].  Call this number to schedule or change appointments.          Medication Refills Policy:    For non-narcotic medications, please your pharmacy directly to request a refill and the pharmacy will call the [facility] for authorization. Please allow 3-4 days for these refills.    For narcotic refills, call the nurse triage line and leave a message requesting your refill along with the name of the pharmacy that you use. Narcotic prescriptions will be mailed to your pharmacy or you may pick them up at the clinic.  Please call 7-10 days before your refill is due  The above policy  allows adequate time so that you do not run out of medication.    No Show - Late Cancellation - Late Arrival Policy  We believe regular attendance is key to your success in our program.    Any time you are unable to keep your appointment we ask that you call us at  least 24 hours in advance to let us know. This will allow us to offer the appointment time to another patient. The following is our policy for missed appointments. This also applies to appointments cancelled with less than 24 hours notice.    You will receive a letter after missing your 1st and 2nd appointments without contacting the clinic before your scheduled appointment time.     After missing 3 appointments without calling first, we will cancel all of your future appointments at [facility].    At that point, you will not be able to resume services unless approved by your care team  We understand that unforseen circumstances arise, however, out of respect for all concerned and to provide this appointment to another patient, this policy will be enforced.    Please note that most follow up appointments are 30 minutes long. If you arrive late, your provider may not be able to see you for the entire 30 minutes. Please also note that if you arrive more than 15 minutes for any appointment, it may be rescheduled.

## 2017-02-17 NOTE — LETTER
REPORT OF WORK ABILITY      PATIENT DATA    Employee Name: Denton Hines      : 1990     #: [id number]    Work related injury: Yes  Employer at time of injury: [employer]  Employer contact & phone: [phone]  Employed elsewhere? No    Today's date: 2017  Date of injury: 7/28/15  Date of first visit: 16 (with DAYANNA Rashid, NP-C)     PROVIDER EVALUATION: Please fill in as needed.  Please give copy to employee for employer.    1. Diagnosis: Complex Regional Pain Syndrome type 2    2. Treatment: block injections, therapies, medications have all been tried.  3. Medication: ibuprofen at this time  NOTE: When ordering a medication, [place] Rules require Work Comp or WC on prescriptions.  4.  Return to work date: now   ** WITH RESTRICTIONS? Yes, with work restrictions: * Other: No use of right hand for grasping type activities or lifting type activities. Avoid exposure to cold temperatures.     DURATION OF LIMITATIONS: unknown at this time    RESTRICTIONS: See above    Maximum Medical Improvement (Date): unknown  Any Permanent Partial Disability? Unknown    Provider comments: Patient has been compliant with all recommended medical treatments.     Medical Examiner: GREGORY Lazo, DAYANNA          License or registration: [id number]    Next appointment: Seen every 6 weeks with this provider.               DAYANNA Rashid, NP-C  [facility]

## 2017-02-17 NOTE — NURSING NOTE
"Chief Complaint   Patient presents with     Pain       Initial BP (!) 157/95  Pulse 105  Resp 16  SpO2 97% Estimated body mass index is 24.18 kg/(m^2) as calculated from the following:    Height as of 7/5/16: 1.77 m (5' 9.69\").    Weight as of 12/1/16: 75.8 kg (167 lb).  Medication Reconciliation: complete       Guru Trevizo MA  [facility]      "

## 2017-02-17 NOTE — PROGRESS NOTES
"[facility]    CHIEF COMPLAINT: right UE pain    INTERVAL HISTORY:  Last seen on 1/8/17.        Recommendations/plan at the last visit included:  1. Schedule hand therapy visits  2. Schedule follow-up with DAYANNA Lazo NP-C in 6-8 weeks  3. Procedures recommended: Continue stellate ganglion blocks every 2 weeks or as Dr Anais Brand recommends.  4. Medication recommendations: No changes at this time.    Since his last visit, Denton Celestino Garcia reports:  - had stellate ganglion block injection on 2/7/17. Has next block injection scheduled on 2/21/17  - Independent medical examiner report states that the patient does not have CRPS. I categorically disagree with this assessment which contradicts the insurance company's previous independent assessment as well.     Pain Information:   Pain quality: Aching, Exhausting and Sharp    Pain timing: Pain is worst first in the morning and at night with use     Pain rating: intensity ranges from 2/10 to 9/10, and averages 6/10 on a 0-10 scale.   Aggravating factors include: Massage/ultrasound/block injections   Relieving factors include:\" Using my hand \"      Annual Controlled Substance Agreement/UDS due date: N/A, no opiates prescribed.     Previous Pain Relevant Medications: (H--helped; HI--Helped initially; SWH--Somewhat helpful; NH--No help; W--worse; SE--side effects; ?--Unsure if helpful)   NOTE: This medication information taken from patient's intake form, not medical records.    Opiates: Hydrocodone: H, Oxycodone: H   NSAIDS: Ibuprofen: H for headaches    Muscle Relaxants: none   Anti-migraine mediations: none   Anti-depressants: none   Sleep aids: none   Anti-convulsants: Gabapentin: NH, Sedation    Topicals: none   Other medications not covered above: non    Any illicit drug use: denies  Any use of prescriptions other than how they were prescribed:carlos alberto  [place] Board of Pharmacy Data Base Reviewed:    YES; No concern for abuse or misuse of " controlled medications based on this report.     SELF CARE:   How often do you practice SELF-CARE (relaxing, stretching, pacing, monitoring posture, taking mini-breaks) in a typical day:  2-3x per week    Medications:  Current Outpatient Prescriptions   Medication Sig Dispense Refill     cyclobenzaprine (FLEXERIL) 5 MG tablet Take 1 tablet (5 mg) by mouth 3 times daily as needed for muscle spasms (If causes sedation just take at bedtime.) 60 tablet 1     lidocaine (LIDODERM) 5 % patch 1-3 patches daily as needed. Have patches on for 12 hours and off for 12 hours. 90 patch 0     lidocaine (XYLOCAINE) 5 % ointment Apply topically as needed for moderate pain 100 g 3     IBUPROFEN PO Take 1 tablet by mouth every 8 hours as needed for moderate pain       order for DME Equipment being ordered: TENS 1 Units 0     MAGNESIUM OXIDE PO        order for DME Equipment being ordered: Neoprene or similar type of wrist/arm compression sleeve. Get largest size so it is not too tight. 1 Units 0     Multiple Vitamins-Minerals (MULTIVITAMIN PO) Take by mouth daily         Review of Systems: A 10-point review of systems was negative, with the exception of chronic pain issues.      Social History: Reviewed; unchanged from initial consultation.      Family history: Reviewed; unchanged from initial consultation.     PHYSICAL EXAM    Vitals:    02/17/17 1255   BP: (!) 157/95   Pulse: 105   Resp: 16   SpO2: 97%       Constitutional: healthy, alert and no distress but frustrated, somewhat depressed today regarding BRIANNE report.  HEENT: Head atraumatic, normocephalic. Eyes without conjunctival injection or jaundice. Neck supple. No obvious neck masses.  Skin: No rash, lesions, or petechiae of exposed skin.   Extremities: Peripheral pulses intact. RUE slightly cooler to the touch and more red in color as compared to LUE. Skin on right hand noticeably more dry and rough and left hand.  Psychiatric/mental status: Alert, without lethargy or stupor.  Appropriate affect. Mood normal.   Neurologic exam:  CN:  Cranial nerves 2-12 are grossly normal.  Motor:  5/5 LUE and 1/5 hand strength    DIRE Score for ongoing opioid management is calculated as follows:    Diagnosis = 2 pts (slowly progressive; moderate pain/objective findings)    Intractability = 3 pts (patient fully engaged but inadequate response to treatments)    Risk        Psych = 3 pts (no significant personality dysfunction/mental illness; good communication with clinic)         Chem Hlth = 3 pts (no history of chemical dependency; not drug-focused)       Reliability = 3 pts (highly reliable with meds, appointments, treatments)       Social = 3 pts (supportive family/close relationships; involved in work/school; no isolation) Unable to work due to medical condition but otherwise fully engaged.        (Psych + Chem hlth + Reliability + Social) = 17    Efficacy = 2 pts (moderate benefit/function; low med dose; too early/not tried meds)      DIRE Score = 19 Patient is not interested in opioid analgesia.         7-13: likely NOT suitable candidate for long-term opioid analgesia       14-21: may be a suitable candidate for long-term opioid analgesia     DIAGNOSTIC TESTS:  Imaging Studies:   No new imaging    Assessment:   1.  CRPS of right upper extremity.   2.  Right shoulder strain secondary to positioning related to CRPS    Denton presents in the company of an  as well as his [employer] worker. As noted above he had an independent medical examination which states that he does not have CRPS. Strongly disagree with this he has objective symptoms of CRPS and does meet the [place] criteria. He has had relief with stellate ganglion blocks and ongoing occupational therapy. It is my recommendation that these are continued as long as he continues to progress. Letter provided to dispute independent medical examiners opinion.    Plan:    Diagnosis reviewed, treatment option addressed, and risk/benifits discussed.   Self-care instructions given.  I am recommending a multidisciplinary treatment plan to help this patient better manage pain.        1. Schedule hand therapy visits  2. Schedule follow-up with DAYANNA Lazo, NPTamaraC in 6 weeks  3. Procedures recommended: Continue block injections with Dr Anais Brand.     Interventional procedures  are done at our [place] and [place] locations.   4. Medication recommendations: No changes at this time.      Total time spent face to face was 30 minutes and more than 50% of face to face time was spent in counseling and/or coordination of care regarding the diagnosis and recommendations above.      DAYANNA Rashid, NP-C   [facility]

## 2017-02-17 NOTE — MR AVS SNAPSHOT
After Visit Summary   2/17/2017    Denton Hines    MRN: [id number]           Patient Information     Date Of Birth          1990        Visit Information        Provider Department      2/17/2017 12:45 PM Jaky Krueger Dawn Marie, APRN CNP [facility]        Care Instructions    After Visit Instructions:     Thank you for coming to [facility] for your care. It is my goal to partner with you to help you reach your optimal state of health.     I am recommending multidisciplinary care at this time.  The focus of care will be to continue gradual rehabilitation and pain management with medication adjustments as needed.    Continue daily self-care, identifying contributing factors, and monitoring variations in pain level. Continue to integrate self-care into your life.      1. Schedule hand therapy visits  2. Schedule follow-up with DAYANNA Lazo, NP-C in 6 weeks  3. Procedures recommended: Continue block injections with Dr Anais Brand.   Interventional procedures  are done at our [place] and [place] locations.   4. Medication recommendations: No changes at this time.     DAYANNA Rashid, NP-C  [facility]    Contact information: [facility]    Please call if any side effects, questions, or concerns arise.    Nurse Triage line:  [phone]   Call this number with any questions or concerns. You may leave a detailed message anytime. Calls are typically returned Monday through Friday between 8 AM and 4:30 PM. We usually get back to you within 2 business days depending on the issue/request.    Scheduling number: [phone].  Call this number to schedule or change appointments.          Medication Refills Policy:    For non-narcotic medications, please your pharmacy directly to request a refill and the pharmacy will call the [facility] for authorization. Please  allow 3-4 days for these refills.    For narcotic refills, call the nurse triage line and leave a message requesting your refill along with the name of the pharmacy that you use. Narcotic prescriptions will be mailed to your pharmacy or you may pick them up at the clinic.  Please call 7-10 days before your refill is due  The above policy allows adequate time so that you do not run out of medication.    No Show - Late Cancellation - Late Arrival Policy  We believe regular attendance is key to your success in our program.    Any time you are unable to keep your appointment we ask that you call us at  least 24 hours in advance to let us know. This will allow us to offer the appointment time to another patient. The following is our policy for missed appointments. This also applies to appointments cancelled with less than 24 hours notice.    You will receive a letter after missing your 1st and 2nd appointments without contacting the clinic before your scheduled appointment time.     After missing 3 appointments without calling first, we will cancel all of your future appointments at [facility].    At that point, you will not be able to resume services unless approved by your care team  We understand that unforseen circumstances arise, however, out of respect for all concerned and to provide this appointment to another patient, this policy will be enforced.    Please note that most follow up appointments are 30 minutes long. If you arrive late, your provider may not be able to see you for the entire 30 minutes. Please also note that if you arrive more than 15 minutes for any appointment, it may be rescheduled.                                   Follow-ups after your visit        Your next 10 appointments already scheduled     Feb 21, 2017 10:30 AM CST   Radiology Injections with Jayro Brand MD, [employer]   [facility] ([facility])    [address]  [address] [facility] [address] [place] [address]   [phone]            Mar 14, 2017 11:00 AM CDT   Radiology Injections with Jayro Brand MD   [facility] ([facility])    [address] [facility] [address] [facility] [address] [place] [address]   [phone]            Mar 28, 2017 11:00 AM CDT   Radiology Injections with Jayro Brand MD   [facility] ([facility])    [address] [facility] [address] [facility] [address] [place] [address] [phone]              Who to contact     If you have questions or need follow up information about today's clinic visit or your schedule please contact [facility] [place] directly at [phone].  Normal or non-critical lab and imaging results will be communicated to you by Ringpayhart, letter or phone within 4 business days after the clinic has received the results. If you do not hear from us within 7 days, please contact the clinic through Ringpayhart or phone. If you have a critical or abnormal lab result, we will notify you by phone as soon as possible.  Submit refill requests through Fliiby or call your pharmacy and they will forward the refill request to us. Please allow 3 business days for your refill to be completed.          Additional Information About Your Visit        Fliiby Information     Fliiby gives you secure access to your electronic health record. If you see a primary care provider, you can also send messages to your care team and make appointments. If you have questions, please call your primary care clinic.  If you do not have a primary care provider, please call [phone] and they will assist you.        Care EveryWhere ID     This is your Care EveryWhere ID. This could be used by other organizations to access your [place] medical records  [id number]        Your Vitals Were     Pulse Respirations Pulse Oximetry             105 16 97%          Blood Pressure from Last 3 Encounters:   02/17/17 (!) 157/95    02/07/17 132/86   01/18/17 (!) 158/92    Weight from Last 3 Encounters:   12/01/16 75.8 kg (167 lb)   11/03/16 73.9 kg (163 lb)   09/26/16 75.3 kg (166 lb)              Today, you had the following     No orders found for display       Primary Care Provider Office Phone # Fax #    Shemar Campbell [phone] [address]  [facility] [address]        Thank you!     Thank you for choosing [facility]  for your care. Our goal is always to provide you with excellent care. Hearing back from our patients is one way we can continue to improve our services. Please take a few minutes to complete the written survey that you may receive in the mail after your visit with us. Thank you!             Your Updated Medication List - Protect others around you: Learn how to safely use, store and throw away your medicines at www.disposemymeds.org.          This list is accurate as of: 2/17/17  1:28 PM.  Always use your most recent med list.                   Brand Name Dispense Instructions for use    cyclobenzaprine 5 MG tablet    FLEXERIL    60 tablet    Take 1 tablet (5 mg) by mouth 3 times daily as needed for muscle spasms (If causes sedation just take at bedtime.)       IBUPROFEN PO      Take 1 tablet by mouth every 8 hours as needed for moderate pain       * lidocaine 5 % ointment    XYLOCAINE    100 g    Apply topically as needed for moderate pain       * lidocaine 5 % Patch    LIDODERM    90 patch    1-3 patches daily as needed. Have patches on for 12 hours and off for 12 hours.       MAGNESIUM OXIDE PO          MULTIVITAMIN PO      Take by mouth daily       * order for DME     1 Units    Equipment being ordered: Neoprene or similar type of wrist/arm compression sleeve. Get largest size so it is not too tight.       * order for DME     1 Units    Equipment being ordered: TENS       * Notice:  This list has 4 medication(s) that are the same as other medications prescribed  for you. Read the directions carefully, and ask your doctor or other care provider to review them with you.

## 2017-02-21 ENCOUNTER — RADIANT APPOINTMENT (OUTPATIENT)
Dept: RADIOLOGY | Facility: CLINIC | Age: 27
End: 2017-02-21
Attending: ANESTHESIOLOGY
Payer: COMMERCIAL

## 2017-02-21 ENCOUNTER — RADIOLOGY INJECTION OFFICE VISIT (OUTPATIENT)
Dept: PALLIATIVE MEDICINE | Facility: CLINIC | Age: 27
End: 2017-02-21
Payer: OTHER MISCELLANEOUS

## 2017-02-21 VITALS — SYSTOLIC BLOOD PRESSURE: 120 MMHG | HEART RATE: 64 BPM | DIASTOLIC BLOOD PRESSURE: 70 MMHG | OXYGEN SATURATION: 98 %

## 2017-02-21 DIAGNOSIS — G56.41 COMPLEX REGIONAL PAIN SYNDROME TYPE 2 OF RIGHT UPPER EXTREMITY: Primary | ICD-10-CM

## 2017-02-21 DIAGNOSIS — G90.511 COMPLEX REGIONAL PAIN SYNDROME TYPE 1 OF RIGHT UPPER EXTREMITY: ICD-10-CM

## 2017-02-21 PROCEDURE — 64510 N BLOCK STELLATE GANGLION: CPT | Mod: RT | Performed by: ANESTHESIOLOGY

## 2017-02-21 ASSESSMENT — PAIN SCALES - GENERAL: PAINLEVEL: EXTREME PAIN (8)

## 2017-02-21 NOTE — NURSING NOTE
"Chief Complaint   Patient presents with     Pain       Initial /75 (BP Location: Left arm)  Pulse 85 Estimated body mass index is 24.18 kg/(m^2) as calculated from the following:    Height as of 7/5/16: 1.77 m (5' 9.69\").    Weight as of 12/1/16: 75.8 kg (167 lb).  Medication Reconciliation: complete     Injection intake:    If this procedure is requiring IV sedation has patient been NPO for 6  Hours? yes    Is patient on coumadin, plavix or other prescribed blood thinner?   No    If patient is on coumadin was it held for 5 days?   NA    If patient is on plavix was it held for 7 days?    NA     Does patient take aspirin?  No    If this is for a cervical procedure and patient is on aspirin has it been held for 6 days?   NA    Any allergies to contrast dye, iodine, steroid and/or numbing medications?  NO    Is patient currently taking antibiotics or have an active infection?  NO    Does patient have a ? Yes       Is patient pregnant or breastfeeding?  Not Applicable    Are the vital signs normal?  Yes    Gwen Castro MA      "

## 2017-02-21 NOTE — PATIENT INSTRUCTIONS
[facility]   Procedure Discharge Instructions    Today you saw:  Dr. Jayro Brand      You had an:  Stellate ganglion block    Medications used:  Lidocaine, lidocaine with epinephrine   depomedrol     omnipaque  Ropivicaine         Stellate Ganglion Block:  You may have one or more of the following: difficulty swallowing, hoarseness, sore throat, mild swelling or bruising or the neck, drooping or redness of the eyelid on the side injected, a very small pupil on the side injected, weakness of the arm on the side injected.  These symptons should go away within 24 hours  If you experience chest pain, shortness of breath or problems breathing go to your local Emergency Room    Do not eat until the lump in your throat goes down (usually about 4 hours)  Do not drink hot drinks for the next 12 hours  You may take small sips of cool drinks or ice chips    Be cautious when walking. Numbness and/or weakness in the lower extremities may occur up to 6-8 hours after the procedure due to effect of the local anesthetic    Do not drive for 6 hours. The effect of the local anesthetic could slow your reflexes.     You may resume your regular activities after 24 hours    Avoid strenuous activity for the first 24 hours    You may shower, however avoid swimming, tub baths or hot tubs for 24 hours following your procedure    You may have a mild to moderate increase in pain for several days following the injection.    It may take up to 14 days for the steroid medication to start working although you may feel the effect as early as a few days after the procedure.       You may use ice packs for 10-15 minutes, 3 to 4 times a day at the injection site for comfort    Do not use heat to painful areas for 6 to 8 hours. This will give the local anesthetic time to wear off and prevent you from accidentally burning your skin.     You may use anti-inflammatory medications (such as Ibuprofen or Aleve or Advil) or Tylenol  for pain control if necessary    If you were fasting, you may resume your normal diet and medications after the procedure    If you have diabetes, check your blood sugar more frequently than usual as your blood sugar may be higher than normal for 10-14 days following a steroid injection. Contact your doctor who manages your diabetes if your blood sugar is higher than usual    If you experience any of the following, call the pain center nursing line during work hours at [phone] or the after hours provider line at [phone]:  -Fever over 100 degree F  -Swelling, bleeding, redness, drainage, warmth at the injection site  -Progressive weakness or numbness in your legs or arms  -Loss of bowel or bladder function  -Unusual headache that is not relieved by Tylenol  -Unusual new onset of pain that is not improving      Phone #s:  Appointment line: [phone];  Nurse line: [phone]

## 2017-02-21 NOTE — PROGRESS NOTES
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 DAYANNA Lazo referred him for a right stellate ganglion block. Options, benefits and risks were discussed with the patient including bleeding, infection, hoarseness, droopy face/eye, swallowing problems, no pain relief, seizure, stroke, paralysis, nerve injury, spinal cord injury, spinal headache, spinal fluid leak, coma, death. Questions were answered to his satisfaction and he agrees to proceed. Voluntary informed consent was obtained and signed. The patient's medical history, medications, and allergies were reviewed and reconciled.     Vitals: /70  Pulse 64  SpO2 98%  Allergies reviewed: Yes  Medications reviewed: Yes  Pre-procedure pain score: 7/10     Examination revealed weakness and decreased light touch sensation in the median nerve distribution of the right hand. Skin color on the right hand was mildly dusky and the temperature on the right hand was cooler to touch than the left.  Skin texture, and hair distribution were symmetric on the upper extremities and Allodynia was negative today.     PROCEDURE IN DETAIL:  After obtaining signed informed consent the patient was brought into the procedure suit and placed in a supine position on the procedure table. Patient's neck area was prepped and draped in the usual sterile fashion. The transverse process of C7 on the right side was identified under AP fluoroscopic guidance. Soft tissue of his neck were retracted using my left hand until palpation of transverse process was obtained. 1 ml of 1% lidocaine was injected at the needle entry point and needle tract  using a 30 gauge 1 inch needle. Then a 27 gauge 3.5 inch spinal needle was inserted and slowly advanced under fluoroscopic guidance until bony contact was made. Lateral fluoroscopic guidance was also obtained to confirm the needle depth and position. After negative aspiration for heme and CSF, 1.5 cc of Omnipaque 300 contrast dye was injected. 8.5 ml of Omnipaque was wasted. There was no vascular uptake, the contrast was spreading distally over the T1 vertebral body. Omnipaque wasted: 9 ml.     A test dose was performed by injecting 1.5 ml of Lidocaine 1% with epinephrine which was negative for adverse effects. Then a combination of 7 ml of 0.2 % Ropivicaine and Depomedrol 40 mg was injected (total injectate volume 8 ml) slowly and incrementally with frequent intermittent aspirations. The needle was then flushed with lidocaine 1% as it was withdrawn, the patient's neck was cleansed and patient was brought to the recovery area.      In the recovery area patient reported significant pain relief in the arm and hand at the time of discharge with a discharge pain score of 1/10 in the arm and hand.  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     Patient's pre procedure pain intensity score was 7/10 and post procedure pain intensity score was 1/10. Patient will continue monitoring progress and we will see the patient again in the clinic for follow up in 2-3 weeks. The patient was then discharged in good condition with discharge instructions.     Follow-up includes:  -post-procedure nursing call at 1 week  -repeat stellate ganglion block in 2 weeks      Jayro Brand MD  [facility]

## 2017-02-21 NOTE — MR AVS SNAPSHOT
After Visit Summary   2/21/2017    Denton Hines    MRN: [id number]           Patient Information     Date Of Birth          1990        Visit Information        Provider Department      2/21/2017 10:30 AM Jayro Johnson MD; [employer] [facility]        Care Instructions    [facility]   Procedure Discharge Instructions    Today you saw:  Dr. Jayro Brand      You had an:  Stellate ganglion block    Medications used:  Lidocaine, lidocaine with epinephrine   depomedrol     omnipaque  Ropivicaine         Stellate Ganglion Block:  You may have one or more of the following: difficulty swallowing, hoarseness, sore throat, mild swelling or bruising or the neck, drooping or redness of the eyelid on the side injected, a very small pupil on the side injected, weakness of the arm on the side injected.  These symptons should go away within 24 hours  If you experience chest pain, shortness of breath or problems breathing go to your local Emergency Room    Do not eat until the lump in your throat goes down (usually about 4 hours)  Do not drink hot drinks for the next 12 hours  You may take small sips of cool drinks or ice chips    Be cautious when walking. Numbness and/or weakness in the lower extremities may occur up to 6-8 hours after the procedure due to effect of the local anesthetic    Do not drive for 6 hours. The effect of the local anesthetic could slow your reflexes.     You may resume your regular activities after 24 hours    Avoid strenuous activity for the first 24 hours    You may shower, however avoid swimming, tub baths or hot tubs for 24 hours following your procedure    You may have a mild to moderate increase in pain for several days following the injection.    It may take up to 14 days for the steroid medication to start working although you may feel the effect as early as a few days after the procedure.       You may use  ice packs for 10-15 minutes, 3 to 4 times a day at the injection site for comfort    Do not use heat to painful areas for 6 to 8 hours. This will give the local anesthetic time to wear off and prevent you from accidentally burning your skin.     You may use anti-inflammatory medications (such as Ibuprofen or Aleve or Advil) or Tylenol for pain control if necessary    If you were fasting, you may resume your normal diet and medications after the procedure    If you have diabetes, check your blood sugar more frequently than usual as your blood sugar may be higher than normal for 10-14 days following a steroid injection. Contact your doctor who manages your diabetes if your blood sugar is higher than usual    If you experience any of the following, call the pain center nursing line during work hours at [phone] or the after hours provider line at [phone]:  -Fever over 100 degree F  -Swelling, bleeding, redness, drainage, warmth at the injection site  -Progressive weakness or numbness in your legs or arms  -Loss of bowel or bladder function  -Unusual headache that is not relieved by Tylenol  -Unusual new onset of pain that is not improving      Phone #s:  Appointment line: [phone];  Nurse line: [phone]            Follow-ups after your visit        Your next 10 appointments already scheduled     Mar 14, 2017 11:00 AM CDT   Radiology Injections with Jayro Brand MD   [facility])    [address] [address] [phone]            Mar 28, 2017 11:00 AM CDT   Radiology Injections with Jayro Brand MD   [facility] ([facility])    [address] [address]   [phone]            Mar 31, 2017  1:00 PM CDT   Return Visit with DAYANNA Wilkins CNP   [facility] ([facility])    [address]  [facility] [address]  [facility]  [address] [phone]              Who to contact     If you have questions or need follow up information about today's clinic visit or your schedule please contact [facility] directly at [phone].  Normal or non-critical lab and imaging results will be communicated to you by MyChart, letter or phone within 4 business days after the clinic has received the results. If you do not hear from us within 7 days, please contact the clinic through MyChart or phone. If you have a critical or abnormal lab result, we will notify you by phone as soon as possible.  Submit refill requests through NuView Systems or call your pharmacy and they will forward the refill request to us. Please allow 3 business days for your refill to be completed.          Additional Information About Your Visit        [employer] [facility] [employer] Information     [employer] gives you secure access to your electronic health record. If you see a primary care provider, you can also send messages to your care team and make appointments. If you have questions, please call your primary care clinic.  If you do not have a primary care provider, please call [phone] and they will assist you.        Care EveryWhere ID     This is your Care EveryWhere ID. This could be used by other organizations to access your [place] medical records  [id number]        Your Vitals Were     Pulse Pulse Oximetry                90 97%           Blood Pressure from Last 3 Encounters:   02/21/17 149/86   02/17/17 (!) 157/95   02/07/17 132/86    Weight from Last 3 Encounters:   12/01/16 75.8 kg (167 lb)   11/03/16 73.9 kg (163 lb)   09/26/16 75.3 kg (166 lb)              Today, you had the following     No orders found for display       Primary Care Provider Office Phone # Fax #    [facility] [phone] [address] [facility] [address]        Thank you!     Thank you for choosing [facility]  for your care. Our goal is always to  provide you with excellent care. Hearing back from our patients is one way we can continue to improve our services. Please take a few minutes to complete the written survey that you may receive in the mail after your visit with us. Thank you!             Your Updated Medication List - Protect others around you: Learn how to safely use, store and throw away your medicines at www.disposemymeds.org.          This list is accurate as of: 2/21/17 12:26 PM.  Always use your most recent med list.                   Brand Name Dispense Instructions for use    cyclobenzaprine 5 MG tablet    FLEXERIL    60 tablet    Take 1 tablet (5 mg) by mouth 3 times daily as needed for muscle spasms (If causes sedation just take at bedtime.)       IBUPROFEN PO      Take 1 tablet by mouth every 8 hours as needed for moderate pain       * lidocaine 5 % ointment    XYLOCAINE    100 g    Apply topically as needed for moderate pain       * lidocaine 5 % Patch    LIDODERM    90 patch    1-3 patches daily as needed. Have patches on for 12 hours and off for 12 hours.       MAGNESIUM OXIDE PO          MULTIVITAMIN PO      Take by mouth daily       * order for DME     1 Units    Equipment being ordered: Neoprene or similar type of wrist/arm compression sleeve. Get largest size so it is not too tight.       * order for DME     1 Units    Equipment being ordered: TENS       * Notice:  This list has 4 medication(s) that are the same as other medications prescribed for you. Read the directions carefully, and ask your doctor or other care provider to review them with you.

## 2017-02-23 ENCOUNTER — TELEPHONE (OUTPATIENT)
Dept: PALLIATIVE MEDICINE | Facility: CLINIC | Age: 27
End: 2017-02-23

## 2017-02-23 NOTE — TELEPHONE ENCOUNTER
Called pt and told him the letter he requested was completed. Pt asked for a copy be mailed and one put at the - done.     Naye Sethi RN, [facility]  Pain Clinic Care Coordinator

## 2017-02-28 ENCOUNTER — TELEPHONE (OUTPATIENT)
Dept: PALLIATIVE MEDICINE | Facility: CLINIC | Age: 27
End: 2017-02-28

## 2017-02-28 NOTE — TELEPHONE ENCOUNTER
Patient had a Stellate ganglion block at right C7 under fluorsoscopic guidance, contrast controlled, on 2/21/17.  Called patient for an update.          Left message that we were calling for an update about how s/he was doing after the injection.  LM that if s/he has any problems or questions to call the nurse line at [phone].

## 2017-03-03 ENCOUNTER — TELEPHONE (OUTPATIENT)
Dept: NURSING | Facility: CLINIC | Age: 27
End: 2017-03-03

## 2017-03-04 NOTE — TELEPHONE ENCOUNTER
Call Type: Triage Call    Presenting Problem: He is having pain and fever and chills and  congestion.  He reports these symptoms started yesterday.  Triage Note:  Guideline Title: Flu-Like Symptoms  Recommended Disposition: See ED Immediately  Original Inclination: Would have called clinic  Override Disposition:  Intended Action: Go to Hospital / ED  Physician Contacted: No  Breathing problems ?  YES  Severe breathing problems ? NO  Sudden change in mental status ? NO  Choking sensation, cannot swallow own saliva with associated drooling and soft  muffled voice ? NO  New onset of stiff neck causing pain with forward head movement, severe  generalized headache, fever, or altered mental status ? NO  Gradual onset of upper respiratory illness signs and symptoms(If there is any  doubt that symptoms may be an upper respiratory illness, use this guideline,  Flu-Like Symptoms ) ? NO  Physician Instructions:  Care Advice: Another adult should drive.  Call  if sudden onset or sudden worsening of breathing problems,  struggling to breathe, high pitched noise when breathing in (stridor),  unable to speak, grasping at throat, or panic/anxiety because of breathing  problems.  IMMEDIATE ACTION  Write down provider's name. List or place the following in a bag for  transport with the patient: current prescription and/or nonprescription  medications  alternative treatments, therapies and medications  and street drugs.  Place person in a position of comfort and loosen tight clothing.  Use prescribed medications, especially rescue inhalers, as directed.  Influenza Respiratory Hygiene: - Cover the nose/mouth tightly with a tissue  when coughing or sneezing. - Use tissue one time and discard in the nearest  waste receptacle. - Wash hands with soap and water or alcohol-based hand  rub for at least 20 seconds after coming into contact with respiratory  secretions and contaminated objects/materials. - When a tissue is  not  available, cough into the bend of the elbow. - Avoid touching your eyes,  nose or mouth. - When ill wear a disposable face mask when around others,  especially around those at high risk for flu-related complications, to help  decrease the likelihood of infecting them.

## 2017-03-14 ENCOUNTER — RADIANT APPOINTMENT (OUTPATIENT)
Dept: RADIOLOGY | Facility: CLINIC | Age: 27
End: 2017-03-14
Attending: ANESTHESIOLOGY
Payer: COMMERCIAL

## 2017-03-14 ENCOUNTER — RADIOLOGY INJECTION OFFICE VISIT (OUTPATIENT)
Dept: PALLIATIVE MEDICINE | Facility: CLINIC | Age: 27
End: 2017-03-14
Payer: OTHER MISCELLANEOUS

## 2017-03-14 VITALS — DIASTOLIC BLOOD PRESSURE: 78 MMHG | SYSTOLIC BLOOD PRESSURE: 118 MMHG | OXYGEN SATURATION: 99 % | HEART RATE: 78 BPM

## 2017-03-14 DIAGNOSIS — G56.41 COMPLEX REGIONAL PAIN SYNDROME TYPE 2 OF RIGHT UPPER EXTREMITY: Primary | ICD-10-CM

## 2017-03-14 DIAGNOSIS — G56.41 COMPLEX REGIONAL PAIN SYNDROME TYPE 2 OF RIGHT UPPER EXTREMITY: ICD-10-CM

## 2017-03-14 PROCEDURE — 64510 N BLOCK STELLATE GANGLION: CPT | Mod: RT | Performed by: ANESTHESIOLOGY

## 2017-03-14 ASSESSMENT — PAIN SCALES - GENERAL: PAINLEVEL: SEVERE PAIN (6)

## 2017-03-14 NOTE — PROGRESS NOTES
Pre procedure Diagnosis: CRPS of the right upper extremity  Post proceudure Diagnosis:Same  Procedure performed: Stellate ganglion block at right C7 under fluorsoscopic guidance  Indication:  therapeutic  Anesthesia: local  Complications: none  Operators: Jayro Brand MD    INDICATIONS:   Denton Hines is a 26 year old male with a h/o CRPS of the right upper extremity.  DAYANNA Lazo referred him for a right stellate ganglion block. Options, benefits and risks were discussed with the patient including bleeding, infection, hoarseness, droopy face/eye, swallowing problems, no pain relief, seizure, stroke, paralysis, nerve injury, spinal cord injury, spinal headache, spinal fluid leak, coma, death. Questions were answered to his satisfaction and he agrees to proceed. Voluntary informed consent was obtained and signed.  The patient's medical history, medications, and allergies were reviewed and reconciled.    PROCEDURE IN DETAIL:  After obtaining signed informed consent the patient was brought into the procedure suit and placed in a supine position on the procedure table. Patient's neck area was prepped and draped in the usual sterile fashion. The transverse process of C7 on the right side was identified under AP fluoroscopic guidance. Soft tissue of his neck were retracted using my left hand until palpation of transverse process was obtained. 1 ml of 1% lidocaine was injected at the needle entry point and needle tract using a 30 gauge 1 inch needle. Then a 27 gauge 3.5 inch quincke type spinal needle was inserted and slowly advanced under fluoroscopic guidance until bony contact was made. Lateral fluoroscopic guidance was also obtained to confirm the needle depth and position. After negative aspiration for heme and CSF, 0.5 cc of Omnipaque contrast dye was injected. There was no vascular uptake, the contrast was spreading distally.  Omnipaque wasted:  9.5 ml.  Then, a test dose was performed by injecting  2 ml of Lidocaine 1% with epinephrine which was negative for adverse effects.  Then I injected 8 ml of 0.2 % Ropivicaine slowly and incrementally with frequent intermittent aspirations. The needle was then restyletted and withdrawn, the patient's neck was cleansed and patient was brought to the recovery area.     In the recovery area the patient reported significant pain relief. 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 Patient's pre procedure  pain intensity score was 7/10 and post procedure pain intensity score was 1-2/10. Patient will continue monitoring progress and we will see the patient again in the clinic for follow up  in 2-3 weeks for repeat stellate ganglion block. The patient was then discharged in good condition with discharge instructions.      Jayro Brand MD  [facility]

## 2017-03-14 NOTE — PATIENT INSTRUCTIONS
[facility]   Procedure Discharge Instructions    Today you saw: Dr. Jayro Brand     You had an:Right stellate ganglion block  Medications used:  Lidocaine  Omnipaque  Ropivicaine            Be cautious when walking. Numbness and/or weakness in the lower extremities may occur up to 6-8 hours after the procedure due to effect of the local anesthetic    Do not drive for 6 hours. The effect of the local anesthetic could slow your reflexes.     You may resume your regular activities after 24 hours    Avoid strenuous activity for the first 24 hours    You may shower, however avoid swimming, tub baths or hot tubs for 24 hours following your procedure    You may have a mild to moderate increase in pain for several days following the injection.    It may take up to 14 days for the steroid medication to start working although you may feel the effect as early as a few days after the procedure.       You may use ice packs for 10-15 minutes, 3 to 4 times a day at the injection site for comfort    Do not use heat to painful areas for 6 to 8 hours. This will give the local anesthetic time to wear off and prevent you from accidentally burning your skin.     You may use anti-inflammatory medications (such as Ibuprofen or Aleve or Advil) or Tylenol for pain control if necessary    If you were fasting, you may resume your normal diet and medications after the procedure    If you have diabetes, check your blood sugar more frequently than usual as your blood sugar may be higher than normal for 10-14 days following a steroid injection. Contact your doctor who manages your diabetes if your blood sugar is higher than usual    If you experience any of the following, call the pain center nursing line during work hours at [phone] or the after hours provider line at [phone]:  -Fever over 100 degree F  -Swelling, bleeding, redness, drainage, warmth at the injection site  -Progressive weakness or numbness in  your legs or arms  -Loss of bowel or bladder function  -Unusual headache that is not relieved by Tylenol  -Unusual new onset of pain that is not improving      Phone #s:  Appointment line: [phone];  Nurse line: [phone]

## 2017-03-14 NOTE — MR AVS SNAPSHOT
After Visit Summary   3/14/2017    Denton Hines    MRN: [id number]           Patient Information     Date Of Birth          1990        Visit Information        Provider Department      3/14/2017 10:45 AM Jayro Johnson MD; [employer] [facility]        Care Instructions    [facility]   Procedure Discharge Instructions    Today you saw: Dr. Jayro Brand     You had an:Right stellate ganglion block  Medications used:  Lidocaine  Omnipaque  Ropivicaine            Be cautious when walking. Numbness and/or weakness in the lower extremities may occur up to 6-8 hours after the procedure due to effect of the local anesthetic    Do not drive for 6 hours. The effect of the local anesthetic could slow your reflexes.     You may resume your regular activities after 24 hours    Avoid strenuous activity for the first 24 hours    You may shower, however avoid swimming, tub baths or hot tubs for 24 hours following your procedure    You may have a mild to moderate increase in pain for several days following the injection.    It may take up to 14 days for the steroid medication to start working although you may feel the effect as early as a few days after the procedure.       You may use ice packs for 10-15 minutes, 3 to 4 times a day at the injection site for comfort    Do not use heat to painful areas for 6 to 8 hours. This will give the local anesthetic time to wear off and prevent you from accidentally burning your skin.     You may use anti-inflammatory medications (such as Ibuprofen or Aleve or Advil) or Tylenol for pain control if necessary    If you were fasting, you may resume your normal diet and medications after the procedure    If you have diabetes, check your blood sugar more frequently than usual as your blood sugar may be higher than normal for 10-14 days following a steroid injection. Contact your doctor who manages your  diabetes if your blood sugar is higher than usual    If you experience any of the following, call the pain center nursing line during work hours at [phone] or the after hours provider line at [phone]:  -Fever over 100 degree F  -Swelling, bleeding, redness, drainage, warmth at the injection site  -Progressive weakness or numbness in your legs or arms  -Loss of bowel or bladder function  -Unusual headache that is not relieved by Tylenol  -Unusual new onset of pain that is not improving      Phone #s:  Appointment line: [phone];  Nurse line: [phone]            Follow-ups after your visit        Your next 10 appointments already scheduled     Mar 28, 2017 11:15 AM CDT   Radiology Injections with Jayro Brand MD   [facility] ([facility])    [address] [facility] [address] [phone]            Mar 31, 2017  1:00 PM CDT   Return Visit with DAYANNA Wilkins CNP   [facility]    [address]  [facility] [address]  [facility] [address] [phone]              Who to contact     If you have questions or need follow up information about today's clinic visit or your schedule please contact [facility] directly at [phone].  Normal or non-critical lab and imaging results will be communicated to you by Hub[employer], letter or phone within 4 business days after the clinic has received the results. If you do not hear from us within 7 days, please contact the clinic through Hubspherehart or phone. If you have a critical or abnormal lab result, we will notify you by phone as soon as possible.  Submit refill requests through [employer] or call your pharmacy and they will forward the refill request to us. Please allow 3 business days for your refill to be completed.          Additional Information About Your Visit        [employer] Information     [employer] gives you secure access to your electronic  health record. If you see a primary care provider, you can also send messages to your care team and make appointments. If you have questions, please call your primary care clinic.  If you do not have a primary care provider, please call [phone] and they will assist you.        Care EveryWhere ID     This is your Care EveryWhere ID. This could be used by other organizations to access your [place] medical records  [id number]        Your Vitals Were     Pulse                   93            Blood Pressure from Last 3 Encounters:   03/14/17 128/70   02/21/17 120/70   02/17/17 (!) 157/95    Weight from Last 3 Encounters:   12/01/16 75.8 kg (167 lb)   11/03/16 73.9 kg (163 lb)   09/26/16 75.3 kg (166 lb)              Today, you had the following     No orders found for display       Primary Care Provider Office Phone # Fax #    [facility] [phone] [address] [address]        Thank you!     Thank you for choosing [facility]  for your care. Our goal is always to provide you with excellent care. Hearing back from our patients is one way we can continue to improve our services. Please take a few minutes to complete the written survey that you may receive in the mail after your visit with us. Thank you!             Your Updated Medication List - Protect others around you: Learn how to safely use, store and throw away your medicines at www.disposemymeds.org.          This list is accurate as of: 3/14/17 11:39 AM.  Always use your most recent med list.                   Brand Name Dispense Instructions for use    cyclobenzaprine 5 MG tablet    FLEXERIL    60 tablet    Take 1 tablet (5 mg) by mouth 3 times daily as needed for muscle spasms (If causes sedation just take at bedtime.)       IBUPROFEN PO      Take 1 tablet by mouth every 8 hours as needed for moderate pain       * lidocaine 5 % ointment    XYLOCAINE    100 g    Apply topically as needed for  moderate pain       * lidocaine 5 % Patch    LIDODERM    90 patch    1-3 patches daily as needed. Have patches on for 12 hours and off for 12 hours.       MAGNESIUM OXIDE PO          MULTIVITAMIN PO      Take by mouth daily       * order for DME     1 Units    Equipment being ordered: Neoprene or similar type of wrist/arm compression sleeve. Get largest size so it is not too tight.       * order for DME     1 Units    Equipment being ordered: TENS       * Notice:  This list has 4 medication(s) that are the same as other medications prescribed for you. Read the directions carefully, and ask your doctor or other care provider to review them with you.

## 2017-03-21 ENCOUNTER — TELEPHONE (OUTPATIENT)
Dept: PALLIATIVE MEDICINE | Facility: CLINIC | Age: 27
End: 2017-03-21

## 2017-03-21 NOTE — TELEPHONE ENCOUNTER
Patient had a Stellate ganglion block at right C7 under fluorsoscopic guidance on 3/14/17.  Called patient for an update.        Left message that we were calling for an update about how s/he was doing after the injection.  LM that if s/he has any problems or questions to call the nurse line at [phone].

## 2017-03-28 ENCOUNTER — RADIANT APPOINTMENT (OUTPATIENT)
Dept: RADIOLOGY | Facility: CLINIC | Age: 27
End: 2017-03-28
Attending: ANESTHESIOLOGY
Payer: OTHER MISCELLANEOUS

## 2017-03-28 ENCOUNTER — RADIOLOGY INJECTION OFFICE VISIT (OUTPATIENT)
Dept: PALLIATIVE MEDICINE | Facility: CLINIC | Age: 27
End: 2017-03-28
Payer: OTHER MISCELLANEOUS

## 2017-03-28 VITALS — SYSTOLIC BLOOD PRESSURE: 130 MMHG | DIASTOLIC BLOOD PRESSURE: 78 MMHG | HEART RATE: 72 BPM

## 2017-03-28 DIAGNOSIS — G56.41 COMPLEX REGIONAL PAIN SYNDROME TYPE 2 OF RIGHT UPPER EXTREMITY: Primary | ICD-10-CM

## 2017-03-28 DIAGNOSIS — G90.511 COMPLEX REGIONAL PAIN SYNDROME TYPE 1 OF RIGHT UPPER EXTREMITY: ICD-10-CM

## 2017-03-28 PROCEDURE — 64510 N BLOCK STELLATE GANGLION: CPT | Mod: RT | Performed by: ANESTHESIOLOGY

## 2017-03-28 ASSESSMENT — PAIN SCALES - GENERAL: PAINLEVEL: MODERATE PAIN (4)

## 2017-03-28 NOTE — NURSING NOTE
"Chief Complaint   Patient presents with     Pain       Initial There were no vitals taken for this visit. Estimated body mass index is 24.18 kg/(m^2) as calculated from the following:    Height as of 7/5/16: 1.77 m (5' 9.69\").    Weight as of 12/1/16: 75.8 kg (167 lb).  Medication Reconciliation: complete     Injection intake:    If this procedure is requiring IV sedation has patient been NPO for 6  Hours? NA    Is patient on coumadin, plavix or other prescribed blood thinner?   No    If patient is on coumadin was it held for 5 days?   NA    If patient is on plavix was it held for 7 days?    NA     Does patient take aspirin?  No    If this is for a cervical procedure and patient is on aspirin has it been held for 6 days?   NA    Any allergies to contrast dye, iodine, steroid and/or numbing medications?  NO    Is patient currently taking antibiotics or have an active infection?  NO    Does patient have a ? Yes       Is patient pregnant or breastfeeding?  Not Applicable    Are the vital signs normal?  Yes    Kate Soliman CMA ([facility])        "

## 2017-03-28 NOTE — MR AVS SNAPSHOT
After Visit Summary   3/28/2017    Denton Hines    MRN: [id number]           Patient Information     Date Of Birth          1990        Visit Information        Provider Department      3/28/2017 11:00 AM Jayro Johnson MD; [employer] [facility]        Care Instructions    [facility]   Procedure Discharge Instructions    Today you saw:    Dr. Jayro Brand     You had an:  Stellate ganglion    Medications used:  Lidocaine    Omnipaque  Ropivicaine       Stellate Ganglion Block:  You may have one or more of the following: difficulty swallowing, hoarseness, sore throat, mild swelling or bruising or the neck, drooping or redness of the eyelid on the side injected, a very small pupil on the side injected, weakness of the arm on the side injected.  These symptons should go away within 24 hours  If you experience chest pain, shortness of breath or problems breathing go to your local Emergency Room    Do not eat until the lump in your throat goes down (usually about 4 hours)  Do not drink hot drinks for the next 12 hours  You may take small sips of cool drinks or ice chips          Be cautious when walking. Numbness and/or weakness in the lower extremities may occur up to 6-8 hours after the procedure due to effect of the local anesthetic    Do not drive for 6 hours. The effect of the local anesthetic could slow your reflexes.     You may resume your regular activities after 24 hours    Avoid strenuous activity for the first 24 hours    You may shower, however avoid swimming, tub baths or hot tubs for 24 hours following your procedure    You may have a mild to moderate increase in pain for several days following the injection.    It may take up to 14 days for the steroid medication to start working although you may feel the effect as early as a few days after the procedure.       You may use ice packs for 10-15 minutes, 3 to 4 times a  day at the injection site for comfort    Do not use heat to painful areas for 6 to 8 hours. This will give the local anesthetic time to wear off and prevent you from accidentally burning your skin.     You may use anti-inflammatory medications (such as Ibuprofen or Aleve or Advil) or Tylenol for pain control if necessary    If you were fasting, you may resume your normal diet and medications after the procedure    If you have diabetes, check your blood sugar more frequently than usual as your blood sugar may be higher than normal for 10-14 days following a steroid injection. Contact your doctor who manages your diabetes if your blood sugar is higher than usual    If you experience any of the following, call the pain center nursing line during work hours at [phone] or the after hours provider line at [phone]:  -Fever over 100 degree F  -Swelling, bleeding, redness, drainage, warmth at the injection site  -Progressive weakness or numbness in your legs or arms  -Loss of bowel or bladder function  -Unusual headache that is not relieved by Tylenol  -Unusual new onset of pain that is not improving      Phone #s:  Appointment line: [phone];  Nurse line: [phone]            Follow-ups after your visit        Your next 10 appointments already scheduled     Mar 31, 2017  1:00 PM CDT   Return Visit with DAYANNA Wilkins CNP   [facility])    [address] [place] [address] [phone]              Who to contact     If you have questions or need follow up information about today's clinic visit or your schedule please contact [place] [facility] directly at [phone].  Normal or non-critical lab and imaging results will be communicated to you by MyChart, letter or phone within 4 business days after the clinic has received the results. If you do not hear from us within 7 days, please contact the clinic through MyChart or phone.  If you have a critical or abnormal lab result, we will notify you by phone as soon as possible.  Submit refill requests through [employer] or call your pharmacy and they will forward the refill request to us. Please allow 3 business days for your refill to be completed.          Additional Information About Your Visit        [employer] Information     [employer] gives you secure access to your electronic health record. If you see a primary care provider, you can also send messages to your care team and make appointments. If you have questions, please call your primary care clinic.  If you do not have a primary care provider, please call [phone] and they will assist you.        Care EveryWhere ID     This is your Care EveryWhere ID. This could be used by other organizations to access your [place] medical records  [id number]        Your Vitals Were     Pulse                   72            Blood Pressure from Last 3 Encounters:   03/28/17 130/78   03/14/17 118/78   02/21/17 120/70    Weight from Last 3 Encounters:   12/01/16 75.8 kg (167 lb)   11/03/16 73.9 kg (163 lb)   09/26/16 75.3 kg (166 lb)              Today, you had the following     No orders found for display       Primary Care Provider Office Phone # Fax #    [facility] [phone] [address] [facility] [address]        Thank you!     Thank you for choosing [facility]  for your care. Our goal is always to provide you with excellent care. Hearing back from our patients is one way we can continue to improve our services. Please take a few minutes to complete the written survey that you may receive in the mail after your visit with us. Thank you!             Your Updated Medication List - Protect others around you: Learn how to safely use, store and throw away your medicines at www.disposemymeds.org.          This list is accurate as of: 3/28/17 12:21 PM.  Always use your most recent med list.                    Brand Name Dispense Instructions for use    cyclobenzaprine 5 MG tablet    FLEXERIL    60 tablet    Take 1 tablet (5 mg) by mouth 3 times daily as needed for muscle spasms (If causes sedation just take at bedtime.)       IBUPROFEN PO      Take 1 tablet by mouth every 8 hours as needed for moderate pain       * lidocaine 5 % ointment    XYLOCAINE    100 g    Apply topically as needed for moderate pain       * lidocaine 5 % Patch    LIDODERM    90 patch    1-3 patches daily as needed. Have patches on for 12 hours and off for 12 hours.       MAGNESIUM OXIDE PO          MULTIVITAMIN PO      Take by mouth daily       * order for DME     1 Units    Equipment being ordered: Neoprene or similar type of wrist/arm compression sleeve. Get largest size so it is not too tight.       * order for DME     1 Units    Equipment being ordered: TENS       * Notice:  This list has 4 medication(s) that are the same as other medications prescribed for you. Read the directions carefully, and ask your doctor or other care provider to review them with you.

## 2017-03-28 NOTE — PROGRESS NOTES
Pre procedure Diagnosis: CRPS of the right upper extremity  Post proceudure Diagnosis:Same  Procedure performed: Stellate ganglion block at right C7 under fluorsoscopic guidance  Indication: therapeutic  Anesthesia: local  Complications: the patient complained of dizziness and numbness of the right upper extremity that resolved prior to his discharge to home without treatment  Operators: Jayro Brand MD     INDICATIONS:   Denton Hines is a 26 year old male with a h/o CRPS of the right upper extremity. DAYANNA Lazo referred him for a repeat right stellate ganglion block. Options, benefits and risks were discussed with the patient including bleeding, infection, hoarseness, droopy face/eye, swallowing problems, no pain relief, seizure, stroke, paralysis, nerve injury, spinal cord injury, spinal headache, spinal fluid leak, coma, death. Questions were answered to his satisfaction and he agrees to proceed. Voluntary informed consent was obtained and signed. The patient's medical history, medications, and allergies were reviewed and reconciled.     PROCEDURE IN DETAIL:  After obtaining signed informed consent the patient was brought into the procedure suit and placed in a supine position on the procedure table. Patient's neck area was prepped and draped in the usual sterile fashion. The transverse process of C7 on the right side was identified under AP fluoroscopic guidance. Soft tissue of his neck were retracted using my left hand until palpation of transverse process was obtained. 1 ml of 1% lidocaine was injected at the needle entry point and needle tract using a 30 gauge 1 inch needle. Then a 27 gauge 3.5 inch quincke type spinal needle was inserted and slowly advanced under fluoroscopic guidance until bony contact was made. Lateral fluoroscopic guidance was also obtained to confirm the needle depth and position. After negative aspiration for heme and CSF, 1 cc of Omnipaque contrast dye was injected.  There was no vascular uptake, the contrast was spreading distally. Omnipaque wasted: 9 ml. Then, a test dose was performed by injecting 3 ml of Lidocaine 2 % which was negative for adverse effects. Then I injected 6 ml of 0.2 % Ropivicaine slowly and incrementally with frequent intermittent aspirations. The needle was then restyletted and withdrawn, the patient's neck was cleansed and patient was brought to the recovery area.      In the recovery area the patient reported significant pain relief. 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 Patient's pre procedure pain intensity score was 4/10 and post procedure pain intensity score was 0/10.     The patient was instructed to follow up with DAYANNA Lazo in the pain clinic and to continue monitoring progress and will return for repeat Stellate Ganglion Block depending on his response to today's injection.  The patient was then discharged in good condition with discharge instructions.        Jayro Brand MD  [facility]

## 2017-03-28 NOTE — PATIENT INSTRUCTIONS
[facility]   Procedure Discharge Instructions    Today you saw:    Dr. Jayro Brand     You had an:  Stellate ganglion    Medications used:  Lidocaine    Omnipaque  Ropivicaine       Stellate Ganglion Block:  You may have one or more of the following: difficulty swallowing, hoarseness, sore throat, mild swelling or bruising or the neck, drooping or redness of the eyelid on the side injected, a very small pupil on the side injected, weakness of the arm on the side injected.  These symptons should go away within 24 hours  If you experience chest pain, shortness of breath or problems breathing go to your local Emergency Room    Do not eat until the lump in your throat goes down (usually about 4 hours)  Do not drink hot drinks for the next 12 hours  You may take small sips of cool drinks or ice chips          Be cautious when walking. Numbness and/or weakness in the lower extremities may occur up to 6-8 hours after the procedure due to effect of the local anesthetic    Do not drive for 6 hours. The effect of the local anesthetic could slow your reflexes.     You may resume your regular activities after 24 hours    Avoid strenuous activity for the first 24 hours    You may shower, however avoid swimming, tub baths or hot tubs for 24 hours following your procedure    You may have a mild to moderate increase in pain for several days following the injection.    It may take up to 14 days for the steroid medication to start working although you may feel the effect as early as a few days after the procedure.       You may use ice packs for 10-15 minutes, 3 to 4 times a day at the injection site for comfort    Do not use heat to painful areas for 6 to 8 hours. This will give the local anesthetic time to wear off and prevent you from accidentally burning your skin.     You may use anti-inflammatory medications (such as Ibuprofen or Aleve or Advil) or Tylenol for pain control if necessary    If you were  fasting, you may resume your normal diet and medications after the procedure    If you have diabetes, check your blood sugar more frequently than usual as your blood sugar may be higher than normal for 10-14 days following a steroid injection. Contact your doctor who manages your diabetes if your blood sugar is higher than usual    If you experience any of the following, call the pain center nursing line during work hours at [phone] or the after hours provider line at [phone]:  -Fever over 100 degree F  -Swelling, bleeding, redness, drainage, warmth at the injection site  -Progressive weakness or numbness in your legs or arms  -Loss of bowel or bladder function  -Unusual headache that is not relieved by Tylenol  -Unusual new onset of pain that is not improving      Phone #s:  Appointment line: [phone];  Nurse line: [phone]

## 2017-03-31 ENCOUNTER — OFFICE VISIT (OUTPATIENT)
Dept: PALLIATIVE MEDICINE | Facility: CLINIC | Age: 27
End: 2017-03-31
Payer: OTHER MISCELLANEOUS

## 2017-03-31 ENCOUNTER — TELEPHONE (OUTPATIENT)
Dept: PALLIATIVE MEDICINE | Facility: CLINIC | Age: 27
End: 2017-03-31

## 2017-03-31 VITALS
HEART RATE: 93 BPM | DIASTOLIC BLOOD PRESSURE: 97 MMHG | OXYGEN SATURATION: 99 % | WEIGHT: 165 LBS | BODY MASS INDEX: 23.89 KG/M2 | SYSTOLIC BLOOD PRESSURE: 151 MMHG | RESPIRATION RATE: 16 BRPM

## 2017-03-31 DIAGNOSIS — M62.838 MUSCLE SPASM: ICD-10-CM

## 2017-03-31 PROCEDURE — T1013 SIGN LANG/ORAL INTERPRETER: HCPCS | Mod: U3 | Performed by: NURSE PRACTITIONER

## 2017-03-31 PROCEDURE — 99214 OFFICE O/P EST MOD 30 MIN: CPT | Performed by: NURSE PRACTITIONER

## 2017-03-31 RX ORDER — CYCLOBENZAPRINE HCL 5 MG
5 TABLET ORAL 3 TIMES DAILY PRN
Qty: 60 TABLET | Refills: 3 | Status: SHIPPED | OUTPATIENT
Start: 2017-03-31 | End: 2017-04-03

## 2017-03-31 ASSESSMENT — PAIN SCALES - GENERAL: PAINLEVEL: MODERATE PAIN (4)

## 2017-03-31 NOTE — NURSING NOTE
"Chief Complaint   Patient presents with     Pain       Initial BP (!) 151/97  Pulse 93  Resp 16  Wt 74.8 kg (165 lb)  SpO2 99%  BMI 23.89 kg/m2 Estimated body mass index is 23.89 kg/(m^2) as calculated from the following:    Height as of 7/5/16: 1.77 m (5' 9.69\").    Weight as of this encounter: 74.8 kg (165 lb).  Medication Reconciliation: complete       Guru Trevizo MA  [facility]      "

## 2017-03-31 NOTE — TELEPHONE ENCOUNTER
Pre-screening Questions for Radiology Injections:    Injection to be done at which interventional clinic site? [facility]    Procedure ordered by DAYANNA ANDERSEN, NP-C     Procedure ordered? Stellate Ganglion Nerve Block    What insurance would patient like us to bill for this procedure? Work Comp      Worker's comp-Any injection DO NOT SCHEDULE and route to Naomi Jerez.      [employer] insurance - For SI joint injections, DO NOT SCHEDULE and route Naomi Jerez.      [employer]- MBB's must be scheduled at LEAST two weeks apart      [employer] - Any injection besides hip/shoulder/knee joint DO NOT SCHEDULE and route to Naomi Jerez. She will obtain PA and call pt back to schedule procedure or notify pt of denial.     Any chance of pregnancy? Not Applicable   If YES, do NOT schedule and route to RN pool    Is an  needed? Yes - Slovenian     Patient has a drive home? (mandatory) YES:     Is patient taking any blood thinners (plavix, coumadin, jantoven, warfarin, heparin, pradaxa or dabigatran )? No   If hold needed, do NOT schedule, route to RN pool     Is patient taking any aspirin products? No     If more than 325mg/day do NOT schedule; route to RN pool     For CERVICAL procedures, hold all aspirin products for 6 days.      Does the patient have a bleeding or clotting disorder? No     If yes, okay to schedule AND route to RN nurse pool    **For any patients with platelet count <100, must be forwarded to provider**    Is patient diabetic?  No  If YES, have them bring their glucometer.    Does patient have an active infection or treated for one within the past week? No     Is patient currently taking any antibiotics?  No     For patients on chronic, preventative, or prophylactic antibiotics, procedures may be scheduled.     For patients on antibiotics for active or recent infection:    Gloria Hawley [place], [place]-antibiotic course must have been completed for  4 days    Melony Ramsay-antibiotic course must have been completed for 7 days    Is patient currently taking any steroid medications? (i.e. Prednisone, Medrol)  Yes -  Eye drops     For patients on steroid medications:    Gloria Hawley [place], [place]-steroid course must have been completed for 4 days    Melony Ramsay-steroid course must have been completed for 7 days    Reviewed with patient:  If you are started on any steroids or antibiotics between now and your appointment, you must contact us because it may affect our ability to perform your procedure.  Yes    Is patient actively being treated for cancer or immunocompromised, including the spleen having been removed? No    If YES, do NOT schedule and route to RN pool     **For Dr. Feldman patients without spleens should have the chart sent to her**    Are you able to get on and off an exam table with minimal or no assistance? Yes  If NO, do NOT schedule and route to RN pool    Are you able to roll over and lay on your stomach with minimal or no assistance? Yes  If NO, do NOT schedule and route to RN pool     Any allergies to contrast dye, iodine, shellfish, or numbing and steroid medications? No  If YES, route to RN pool AND add allergy information to appointment notes    Allergies: Review of patient's allergies indicates no known allergies.        Has the patient had a flu shot or any other vaccinations within 7 days before or after the procedure.  No       Does patient have an MRI/CT?  Not Applicable  (SI joint, hip injections, lumbar sympathetic blocks, and stellate ganglion blocks do not require an MRI)    Was the MRI done w/in the last 3 years?  NA    Was MRI done at [facility]? No      If not, where was it done? N/A       If MRI was not done at [facility], [facility] or [facility] do NOT schedule and route to nursing.  If pt has an imaging disc, the injection may be scheduled but pt has to bring disc to appt. If they show up w/out disc  the injection cannot be done    Reminders (please tell patient if applicable):       Instructed pt to arrive 30 minutes early for IV start if this is for a cervical procedure, ALL sympathetic (stellate ganglion, hypogastric, or lumbar sympathetic block) and all sedation procedures (RFA, spinal cord stimulation trials).  Not Applicable    -IVs are not routinely placed for Castro and Egyhazi cervical case       If NPO for sedation, informed patient that it is okay to take medications with sips of water (except if they are to hold blood thinners).  Not Applicable   *DO take blood pressure medication if it is prescribed*      If this is for a cervical JAROCHO, informed patient that aspirin needs to be held for 6 days.   Not Applicable      For all patients not having spinal cord stimulator (SCS) trials or radiofrequency ablations (RFAs), informed patient:  IV sedation is not provided for this procedure.  If you feel that an oral anti-anxiety medication is needed, you can discuss this further with your referring provider or primary care provider.  The Pain Clinic provider will discuss specifics of what the procedure includes at your appointment.  Most procedures last 10-20 minutes.  We use numbing medications to help with any discomfort during the procedure.  Not Applicable      Do not schedule procedures requiring IV placement in the first appointment of the day or first appointment after lunch. N/A      For patients 85 or older we recommend having an adult stay w/ them for the remainder of the day.   N/A    Does the patient have any questions?  NO  Candace Richardson  [facility]

## 2017-03-31 NOTE — MR AVS SNAPSHOT
After Visit Summary   3/31/2017    Denton Hines    MRN: [id number]           Patient Information     Date Of Birth          1990        Visit Information        Provider Department      3/31/2017 12:45 PM Sage Witt Dawn Marie, APRN CNP [facility]        Today's Diagnoses     Muscle spasm          Care Instructions    After Visit Instructions:     Thank you for coming to [facility] for your care. It is my goal to partner with you to help you reach your optimal state of health.     I am recommending multidisciplinary care at this time.  The focus of care will be to continue gradual rehabilitation and pain management with medication adjustments as needed.    Continue daily self-care, identifying contributing factors, and monitoring variations in pain level. Continue to integrate self-care into your life.      1. Schedule pain psychology visit. Contact information given  2. Continue occupational therapy visits  3. Schedule follow-up with DAYANNA Lazo NP-C in 6 weeks  4. Procedures recommended: Continue Stellate ganglion blocks every 2-3 weeks   5. Release of information: For Lulu to talk Dr Faith  6. Medication recommendations: Cymbalta. Call Lulu or send message if you would like to start this medication. It treats pain and depression.   7. Work restrictions per 2/22/17. No changes      DAYANNA Rashid, NP-C  [facility]    Contact information: [facility]    Please call if any side effects, questions, or concerns arise.    Nurse Triage line:  [phone]   Call this number with any questions or concerns. You may leave a detailed message anytime. Calls are typically returned Monday through Friday between 8 AM and 4:30 PM. We usually get back to you within 2 business days depending on the issue/request.    Scheduling number: [phone].  Call this number to schedule  or change appointments.          Medication Refills Policy:    For non-narcotic medications, please your pharmacy directly to request a refill and the pharmacy will call the [facility] for authorization. Please allow 3-4 days for these refills.    For narcotic refills, call the nurse triage line and leave a message requesting your refill along with the name of the pharmacy that you use. Narcotic prescriptions will be mailed to your pharmacy or you may pick them up at the clinic.  Please call 7-10 days before your refill is due  The above policy allows adequate time so that you do not run out of medication.    No Show - Late Cancellation - Late Arrival Policy  We believe regular attendance is key to your success in our program.    Any time you are unable to keep your appointment we ask that you call us at  least 24 hours in advance to let us know. This will allow us to offer the appointment time to another patient. The following is our policy for missed appointments. This also applies to appointments cancelled with less than 24 hours notice.    You will receive a letter after missing your 1st and 2nd appointments without contacting the clinic before your scheduled appointment time.     After missing 3 appointments without calling first, we will cancel all of your future appointments at [facility].    At that point, you will not be able to resume services unless approved by your care team  We understand that unforseen circumstances arise, however, out of respect for all concerned and to provide this appointment to another patient, this policy will be enforced.    Please note that most follow up appointments are 30 minutes long. If you arrive late, your provider may not be able to see you for the entire 30 minutes. Please also note that if you arrive more than 15 minutes for any appointment, it may be rescheduled.                                   Follow-ups after your visit        Who  to contact     If you have questions or need follow up information about today's clinic visit or your schedule please contact [facility] directly at [phone].  Normal or non-critical lab and imaging results will be communicated to you by MyChart, letter or phone within 4 business days after the clinic has received the results. If you do not hear from us within 7 days, please contact the clinic through [employer] or phone. If you have a critical or abnormal lab result, we will notify you by phone as soon as possible.  Submit refill requests through [email or web address] or call your pharmacy and they will forward the refill request to us. Please allow 3 business days for your refill to be completed.          Additional Information About Your Visit        [email or web address] Information     [email or web address] gives you secure access to your electronic health record. If you see a primary care provider, you can also send messages to your care team and make appointments. If you have questions, please call your primary care clinic.  If you do not have a primary care provider, please call [phone] and they will assist you.        Care EveryWhere ID     This is your Care EveryWhere ID. This could be used by other organizations to access your [facility] medical records  [id number]        Your Vitals Were     Pulse Respirations Pulse Oximetry BMI (Body Mass Index)          93 16 99% 23.89 kg/m2         Blood Pressure from Last 3 Encounters:   03/31/17 (!) 151/97   03/28/17 (P) 126/84   03/14/17 118/78    Weight from Last 3 Encounters:   03/31/17 74.8 kg (165 lb)   12/01/16 75.8 kg (167 lb)   11/03/16 73.9 kg (163 lb)              Today, you had the following     No orders found for display         Where to get your medicines      These medications were sent to [id number] - [place] ROSAS - [address] AT [facility] & [address]  [address] [address]    Hours:  24-hours Phone:   [phone]     cyclobenzaprine 5 MG tablet          Primary Care Provider Office Phone # Fax #    Tioga Adrian [phone] [address]  [facility] [address]        Thank you!     Thank you for choosing [facility]  for your care. Our goal is always to provide you with excellent care. Hearing back from our patients is one way we can continue to improve our services. Please take a few minutes to complete the written survey that you may receive in the mail after your visit with us. Thank you!             Your Updated Medication List - Protect others around you: Learn how to safely use, store and throw away your medicines at www.disposemymeds.org.          This list is accurate as of: 3/31/17  1:25 PM.  Always use your most recent med list.                   Brand Name Dispense Instructions for use    cyclobenzaprine 5 MG tablet    FLEXERIL    60 tablet    Take 1 tablet (5 mg) by mouth 3 times daily as needed for muscle spasms (If causes sedation just take at bedtime.)       IBUPROFEN PO      Take 1 tablet by mouth every 8 hours as needed for moderate pain       * lidocaine 5 % ointment    XYLOCAINE    100 g    Apply topically as needed for moderate pain       * lidocaine 5 % Patch    LIDODERM    90 patch    1-3 patches daily as needed. Have patches on for 12 hours and off for 12 hours.       MAGNESIUM OXIDE PO          MULTIVITAMIN PO      Take by mouth daily       * order for DME     1 Units    Equipment being ordered: Neoprene or similar type of wrist/arm compression sleeve. Get largest size so it is not too tight.       * order for DME     1 Units    Equipment being ordered: TENS       * Notice:  This list has 4 medication(s) that are the same as other medications prescribed for you. Read the directions carefully, and ask your doctor or other care provider to review them with you.

## 2017-03-31 NOTE — PATIENT INSTRUCTIONS
After Visit Instructions:     Thank you for coming to [facility] for your care. It is my goal to partner with you to help you reach your optimal state of health.     I am recommending multidisciplinary care at this time.  The focus of care will be to continue gradual rehabilitation and pain management with medication adjustments as needed.    Continue daily self-care, identifying contributing factors, and monitoring variations in pain level. Continue to integrate self-care into your life.      1. Schedule pain psychology visit. Contact information given  2. Continue occupational therapy visits  3. Schedule follow-up with DAYANNA Lazo NP-C in 6 weeks  4. Procedures recommended: Continue Stellate ganglion blocks every 2-3 weeks   5. Release of information: For Lulu to talk Dr Faith  6. Medication recommendations: Cymbalta. Call Lulu or send message if you would like to start this medication. It treats pain and depression.   7. Work restrictions per 2/22/17. No changes      DAYANNA Rashid NP-C  [facility]    Contact information: [facility]    Please call if any side effects, questions, or concerns arise.    Nurse Triage line:  [phone]   Call this number with any questions or concerns. You may leave a detailed message anytime. Calls are typically returned Monday through Friday between 8 AM and 4:30 PM. We usually get back to you within 2 business days depending on the issue/request.    Scheduling number: [phone].  Call this number to schedule or change appointments.          Medication Refills Policy:    For non-narcotic medications, please your pharmacy directly to request a refill and the pharmacy will call the [facility] for authorization. Please allow 3-4 days for these refills.    For narcotic refills, call the nurse triage line and leave a message requesting your refill along with the name of the  pharmacy that you use. Narcotic prescriptions will be mailed to your pharmacy or you may pick them up at the clinic.  Please call 7-10 days before your refill is due  The above policy allows adequate time so that you do not run out of medication.    No Show - Late Cancellation - Late Arrival Policy  We believe regular attendance is key to your success in our program.    Any time you are unable to keep your appointment we ask that you call us at  least 24 hours in advance to let us know. This will allow us to offer the appointment time to another patient. The following is our policy for missed appointments. This also applies to appointments cancelled with less than 24 hours notice.    You will receive a letter after missing your 1st and 2nd appointments without contacting the clinic before your scheduled appointment time.     After missing 3 appointments without calling first, we will cancel all of your future appointments at [facility].    At that point, you will not be able to resume services unless approved by your care team  We understand that unforseen circumstances arise, however, out of respect for all concerned and to provide this appointment to another patient, this policy will be enforced.    Please note that most follow up appointments are 30 minutes long. If you arrive late, your provider may not be able to see you for the entire 30 minutes. Please also note that if you arrive more than 15 minutes for any appointment, it may be rescheduled.

## 2017-03-31 NOTE — PROGRESS NOTES
[facility]    CHIEF COMPLAINT: RUE pain    INTERVAL HISTORY:  Last seen on 2/17/17.        Recommendations/plan at the last visit included:  1. Schedule hand therapy visits  2. Schedule follow-up with DAYANNA Lazo NP-C in 6 weeks  3. Procedures recommended: Continue block injections with Dr Anais Brand.     Interventional procedures  are done at our [place] and [place] locations.   4. Medication recommendations: No changes at this time    Since his last visit, Denton Celestino Garcia reports:  - Had block injections on 2/7/17, 2/21/17, 3/14/17, and 3/28/17. He reports increase strength, less pain, and better ROM with stellate ganglion blocks. He is going to PT weekly and doing HEP daily.   - He is very frustrated/depressed from dealing with W/C people. Had court hearing last week. W/C are trying to have all his care moved to [place] where he lives. Denton wants to keep care with current providers    Pain Information:   Pain quality: Aching, Sharp, Tender, Throbbing and Tiring    Pain timing: Worst in morning and at night     Pain rating: intensity ranges from 3/10 to 10/10, and averages 6/10 on a 0-10 scale.   Aggravating factors include: Nothing noted   Relieving factors include: nothing noted      Annual Controlled Substance Agreement/UDS due date: N/A    Previous Pain Relevant Medications: (H--helped; HI--Helped initially; SWH--Somewhat helpful; NH--No help; W--worse; SE--side effects; ?--Unsure if helpful)   NOTE: This medication information taken from patient's intake form, not medical records.    Opiates: Hydrocodone: H, Oxycodone: H   NSAIDS: Ibuprofen: H for headaches    Muscle Relaxants: none   Anti-migraine mediations: none   Anti-depressants: none   Sleep aids: none   Anti-convulsants: Gabapentin: NH, Sedation    Topicals: none   Other medications not covered above: non    Any illicit drug use: denies  Any use of prescriptions other than how they were prescribed:denies  [place]  Board of Pharmacy Data Base Reviewed:    YES; No concern for abuse or misuse of controlled medications based on this report.     SELF CARE:   How often do you practice SELF-CARE (relaxing, stretching, pacing, monitoring posture, taking mini-breaks) in a typical day:  2-3x per week      Medications:  Current Outpatient Prescriptions   Medication Sig Dispense Refill     cyclobenzaprine (FLEXERIL) 5 MG tablet Take 1 tablet (5 mg) by mouth 3 times daily as needed for muscle spasms (If causes sedation just take at bedtime.) 60 tablet 1     lidocaine (LIDODERM) 5 % patch 1-3 patches daily as needed. Have patches on for 12 hours and off for 12 hours. 90 patch 0     lidocaine (XYLOCAINE) 5 % ointment Apply topically as needed for moderate pain 100 g 3     IBUPROFEN PO Take 1 tablet by mouth every 8 hours as needed for moderate pain       order for DME Equipment being ordered: TENS 1 Units 0     MAGNESIUM OXIDE PO        order for DME Equipment being ordered: Neoprene or similar type of wrist/arm compression sleeve. Get largest size so it is not too tight. 1 Units 0     Multiple Vitamins-Minerals (MULTIVITAMIN PO) Take by mouth daily         Review of Systems: A 10-point review of systems was negative, with the exception of chronic pain issues.      Social History: Reviewed; unchanged from initial consultation.      Family history: Reviewed; unchanged from initial consultation.     PHYSICAL EXAM    Vitals:    03/31/17 1252   BP: (!) 151/97   Pulse: 93   Resp: 16   SpO2: 99%   Weight: 74.8 kg (165 lb)       Constitutional: healthy, alert and mild emotional distress, rustrated, somewhat depressed, tearful.  HEENT: Head atraumatic, normocephalic. Eyes without conjunctival injection or jaundice. Neck supple. No obvious neck masses.  Skin: No rash, lesions, or petechiae of exposed skin.   Extremities: Peripheral pulses intact. RUE slightly cooler to the touch and more red in color as compared to LUE. Skin on right hand noticeably  more dry and rough and left hand.  Psychiatric/mental status: Alert, without lethargy or stupor. Appropriate affect. Mood normal.   Neurologic exam:  CN:  Cranial nerves 2-12 are grossly normal.  Motor:  5/5 LUE and 1/5 hand strength    DIRE Score for ongoing opioid management is calculated as follows:    Diagnosis = 2 pts (slowly progressive; moderate pain/objective findings)    Intractability = 3 pts (patient fully engaged but inadequate response to treatments)    Risk        Psych = 3 pts (no significant personality dysfunction/mental illness; good communication with clinic)         Chem Hlth = 3 pts (no history of chemical dependency; not drug-focused)       Reliability = 3 pts (highly reliable with meds, appointments, treatments)       Social = 3 pts (supportive family/close relationships; involved in work/school; no isolation) Unable to work due to medical condition but otherwise fully engaged.        (Psych + Chem hlth + Reliability + Social) = 17    Efficacy = 2 pts (moderate benefit/function; low med dose; too early/not tried meds)      DIRE Score = 19 Patient is not interested in opioid analgesia.         7-13: likely NOT suitable candidate for long-term opioid analgesia       14-21: may be a suitable candidate for long-term opioid analgesia     DIAGNOSTIC TESTS:  Imaging Studies:   No new imaging    Assessment:   1.  CRPS of right upper extremity.   2.  Right shoulder strain secondary to positioning related to CRPS  3.  Situational Depression    Denton presents in the company of [facility] rep Luis Mcallister and an . He is tearful when talking about his frustration with doing everything he is told to do and no resolution to worker's comp case.     Reviewed results from stellate ganglion blocks. He does note improvement in  strength, ROM and pain with frequent blocks. He is concerned about double vision and whether blocks could be playing a part in this. He is seeing an opthamologist about this double  vision.     I am concerned about his level of depression. I have provided a referral to pain psychology and have discussed starting Cymbalta for pain and depression. He will think about the medication.     Plan:    Diagnosis reviewed, treatment option addressed, and risk/benifits discussed.  Self-care instructions given.  I am recommending a multidisciplinary treatment plan to help this patient better manage pain.        1. Schedule pain psychology visit. Contact information given  2. Continue occupational therapy visits  3. Schedule follow-up with DAYANNA Lazo, NPTamaraC in 6 weeks  4. Procedures recommended: Continue Stellate ganglion blocks every 2-3 weeks   5. Release of information: For Lulu to talk Dr Faith  6. Medication recommendations: Cymbalta. Call Lulu or send message if you would like to start this medication. It treats pain and depression.   7. Work restrictions per 2/22/17. No changes    Total time spent face to face was 30 minutes and more than 50% of face to face time was spent in counseling and/or coordination of care regarding the diagnosis and recommendations above.      DAYANNA Rashid, NP-C   [facility]

## 2017-04-03 ENCOUNTER — TELEPHONE (OUTPATIENT)
Dept: PALLIATIVE MEDICINE | Facility: CLINIC | Age: 27
End: 2017-04-03

## 2017-04-03 DIAGNOSIS — M62.838 MUSCLE SPASM: ICD-10-CM

## 2017-04-03 RX ORDER — CYCLOBENZAPRINE HCL 5 MG
5 TABLET ORAL 3 TIMES DAILY PRN
Qty: 60 TABLET | Refills: 3 | Status: SHIPPED | OUTPATIENT
Start: 2017-04-03 | End: 2019-07-10

## 2017-04-03 NOTE — TELEPHONE ENCOUNTER
Called Denton. He reports that the court decided to discontinue work comp. His  will be appealing it but it will take 3 months for a decision. He longer has a [employer]. He was asking about the fee to see the Health Psychologist , Gisel Faith . He wants us to send him her business card . This done. Emily mckoy rn

## 2017-04-03 NOTE — TELEPHONE ENCOUNTER
Received fax request from [facility]'s pharmacy requesting refill(s) for cyclobenzaprine (FLEXERIL) 5 MG tablet    Last refilled on     Pt last seen on 3/31/17  Next appt scheduled for 5/12/17      uGru Trevizo MA  [facility]      Will facilitate refill.

## 2017-04-03 NOTE — TELEPHONE ENCOUNTER
Pt LM at 1450:    States that he would like to speak with Lulu Fields.  ------------  Will route to SHARI Ballard, RN-BC  Patient Care Supervisor/Care Coordinator  [facility]

## 2017-04-04 NOTE — TELEPHONE ENCOUNTER
Noted. Dr Faith may take his regular medical insurance. He can call her to see. Please have him let me know if there is anything that I can do to help.     DAYANNA Rashid, NP-C  [facility]

## 2017-04-04 NOTE — TELEPHONE ENCOUNTER
Called wc  to continue with authorization for Stellate Ganglion Blocks, ashley denied any further treatment after Feb. 28th. Informed patient. He wishes to bill [employer] now, and will call back to schedule.     Sydni Wood    [facility]

## 2017-04-05 ENCOUNTER — TELEPHONE (OUTPATIENT)
Dept: PALLIATIVE MEDICINE | Facility: CLINIC | Age: 27
End: 2017-04-05

## 2017-04-05 NOTE — TELEPHONE ENCOUNTER
Reason for call:  Patient reporting a symptom    Symptom or request: swelling on right shoulder    Duration (how long have symptoms been present): 1 day    Have you been treated for this before? No    Additional comments:     Phone Number patient can be reached at:  Home number on file [phone] (home)    Best Time:  ASP    Can we leave a detailed message on this number:  YES    Call taken on 4/5/2017 at 4:26 PM by Osvaldo Lema

## 2017-04-06 ENCOUNTER — TRANSFERRED RECORDS (OUTPATIENT)
Dept: HEALTH INFORMATION MANAGEMENT | Facility: CLINIC | Age: 27
End: 2017-04-06

## 2017-04-06 ENCOUNTER — MYC MEDICAL ADVICE (OUTPATIENT)
Dept: PALLIATIVE MEDICINE | Facility: CLINIC | Age: 27
End: 2017-04-06

## 2017-04-06 NOTE — TELEPHONE ENCOUNTER
This is a duplicate encounter. Closing.    SHANIA SwainN, RN  Care Coordinator  [facility]

## 2017-04-06 NOTE — TELEPHONE ENCOUNTER
Dr. Lawson  I have experienced pain on my hand up to the shoulder and neck. It is extremely painful that I couldn't sleep. My therapy doctor noticed that my shoulder is swallowed so bad yesterday. Please let me know how can I manage the pain.     Thank you

## 2017-04-06 NOTE — TELEPHONE ENCOUNTER
I called Raed back and relayed the information below. He understood and said he will see his PCP.    SHANIA SwainN, RN  Care Coordinator  [facility] [place]

## 2017-04-06 NOTE — TELEPHONE ENCOUNTER
Please call Raed. Without seeing him I cannot assess this new issue. It may be a flare of his CRPS however he has not had significant swelling as he describes. I would recommend that he see his primary care provider in [place] to have this evaluated.     DAYANNA Rashid, NP-C  [facility]

## 2017-04-18 ENCOUNTER — TELEPHONE (OUTPATIENT)
Dept: PALLIATIVE MEDICINE | Facility: CLINIC | Age: 27
End: 2017-04-18

## 2017-04-18 NOTE — TELEPHONE ENCOUNTER
Paperwork received from law office of [employer]ov[employer]. Requesting narrative and medical records. Medical record request faxed to  medical records. RightFax confirmed.  Paperwork put in Dr. Anais Brand's in-basket.

## 2017-04-25 ENCOUNTER — RADIANT APPOINTMENT (OUTPATIENT)
Dept: RADIOLOGY | Facility: CLINIC | Age: 27
End: 2017-04-25
Attending: ANESTHESIOLOGY
Payer: COMMERCIAL

## 2017-04-25 ENCOUNTER — RADIOLOGY INJECTION OFFICE VISIT (OUTPATIENT)
Dept: PALLIATIVE MEDICINE | Facility: CLINIC | Age: 27
End: 2017-04-25
Payer: COMMERCIAL

## 2017-04-25 VITALS — HEART RATE: 77 BPM | SYSTOLIC BLOOD PRESSURE: 135 MMHG | OXYGEN SATURATION: 99 % | DIASTOLIC BLOOD PRESSURE: 86 MMHG

## 2017-04-25 DIAGNOSIS — G90.519 COMPLEX REGIONAL PAIN SYNDROME TYPE 1 OF UPPER EXTREMITY, UNSPECIFIED LATERALITY: ICD-10-CM

## 2017-04-25 DIAGNOSIS — M25.531 RIGHT WRIST PAIN: ICD-10-CM

## 2017-04-25 DIAGNOSIS — G56.41 COMPLEX REGIONAL PAIN SYNDROME TYPE 2 OF RIGHT UPPER EXTREMITY: Primary | ICD-10-CM

## 2017-04-25 PROCEDURE — 64510 N BLOCK STELLATE GANGLION: CPT | Mod: RT | Performed by: ANESTHESIOLOGY

## 2017-04-25 RX ORDER — NEOMYCIN SULFATE, POLYMYXIN B SULFATE AND DEXAMETHASONE 3.5; 10000; 1 MG/ML; [USP'U]/ML; MG/ML
1 SUSPENSION/ DROPS OPHTHALMIC 4 TIMES DAILY
COMMUNITY
End: 2019-07-10

## 2017-04-25 ASSESSMENT — PAIN SCALES - GENERAL
PAINLEVEL: MILD PAIN (2)
PAINLEVEL: EXTREME PAIN (8)

## 2017-04-25 NOTE — TELEPHONE ENCOUNTER
Paperwork submitted to [employer].  Will await their recommendations.    Jayro Brand MD  [facility]

## 2017-04-25 NOTE — PROGRESS NOTES
Pre procedure Diagnosis: CRPS of  right upper extremity  Post proceudure Diagnosis:Same  Procedure performed: Stellate ganglion block at C7 on the right under fluorsoscopic guidance, contrast controlled  Anesthesia: local  Complications: none  Operators: Jayro Brand MD    INDICATIONS:   Denton Hines is a 26 year old male with a h/o CRPS of the right upper extremity since a work injury.  DAYANNA Lazo referred him for a repeat right stellate ganglion block. Options, benefits and risks were discussed with the patient including bleeding, infection, hoarseness, droopy face/eye, swallowing problems, no pain relief, seizure, stroke, paralysis, nerve injury, spinal cord injury, spinal headache, spinal fluid leak, coma, death. Questions were answered to his satisfaction and he agrees to proceed. Voluntary informed consent was obtained and signed.  The patient's medical history, medications, and allergies were reviewed and reconciled.    His examination today revealed what appeared to be a hyperemic right forearm and hand compared to the left with noticeably decreased temperature of the right hand compared to the left.  The right hand was hypersensitive and allodynic more on the palmar aspect of the hand and wrist.  Strength and range of motion seemed intact with some degree of decreased  strength, possibly due to discomfort.    PROCEDURE IN DETAIL:  After obtaining signed informed consent the patient was brought into the procedure suit and placed in a supine position on the procedure table. Patient's neck area was prepped and draped in the usual sterile fashion. The transverse process of C7 on the right side was identified under AP fluoroscopic guidance. Soft tissue of his neck were retracted using my left hand until palpation of transverse process was obtained. 1 ml of 1% lidocaine mixed with sodium bicarbonate was injected at the needle entry point and needle tract using a 30 gauge 1 inch needle. Then  a 27 gauge 3.5 inch quincke type spinal needle was inserted and slowly advanced under fluoroscopic guidance until bony contact was made. Lateral fluoroscopic guidance was also obtained to confirm the needle depth and position. After negative aspiration for heme and CSF, 1 cc of Omnipaque contrast dye was injected. There was no vascular uptake, the contrast was spreading distally along the anterior aspect of T1.  Omnipaque wasted:  4 ml.      Then, a test dose was performed by injecting 2 ml of Lidocaine 1% with epinephrine which was negative for adverse effects.  Then I injected 8 ml of a combination of Depomedrol 40 mg and Ropivicaine 0.2% 7 ml slowly and incrementally with frequent intermittent aspirations. The needle was then restyletted and withdrawn, the patient's neck was cleansed and patient was brought to the recovery area.     In the recovery area patient reported significant pain relief. 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 Patient's pre procedure  pain intensity score was 8/10 and post procedure pain intensity score was 2/10 with increased temperature and color of the right hand and forearm. Patient will continue monitoring progress and we will see the patient again in the clinic for follow up  in 2-3 weeks. The patient was then discharged in good condition with discharge instructions.    Follow up includes:  - post procedure nurse call in 1 week  - follow up with referring provider - consideration of spinal cord stimulation for more definitive treatment of CRPS pain may be indicated at this time    Jayro Brand MD  [facility]

## 2017-04-25 NOTE — NURSING NOTE
20 gauge Peripheral IV inserted into left anticubital - attempts: 1    Naye Sethi RN, [facility]  Pain Clinic Care Coordinator

## 2017-04-25 NOTE — NURSING NOTE
"Chief Complaint   Patient presents with     Pain       Initial /72  Pulse 92 Estimated body mass index is 23.89 kg/(m^2) as calculated from the following:    Height as of 7/5/16: 1.77 m (5' 9.69\").    Weight as of 3/31/17: 74.8 kg (165 lb).  Medication Reconciliation: complete     Injection intake:    If this procedure is requiring IV sedation has patient been NPO for 6  Hours? NA    Is patient on coumadin, plavix or other prescribed blood thinner?   No    If patient is on coumadin was it held for 5 days?   NA    If patient is on plavix was it held for 7 days?    NA     Does patient take aspirin?  No    If this is for a cervical procedure and patient is on aspirin has it been held for 6 days?   NA    Any allergies to contrast dye, iodine, steroid and/or numbing medications?  NO    Is patient currently taking antibiotics or have an active infection?  NO    Does patient have a ? Yes       Is patient pregnant or breastfeeding?  NO    Are the vital signs normal?  Yes    Kate Soliman CMA ([facility])        "

## 2017-04-25 NOTE — MR AVS SNAPSHOT
After Visit Summary   4/25/2017    Denton Hines    MRN: [id number]           Patient Information     Date Of Birth          1990        Visit Information        Provider Department      4/25/2017 11:00 AM Jayro Johnson MD; [employer] [facility]        Care Instructions    [facility]   Procedure Discharge Instructions    Today you saw:  Dr. Jayro Brand     Stellate Ganglion Block:  You may have one or more of the following: difficulty swallowing, hoarseness, sore throat, mild swelling or bruising or the neck, drooping or redness of the eyelid on the side injected, a very small pupil on the side injected, weakness of the arm on the side injected.  These symptons should go away within 24 hours  If you experience chest pain, shortness of breath or problems breathing go to your local Emergency Room    Do not eat until the lump in your throat goes down (usually about 4 hours)  Do not drink hot drinks for the next 12 hours  You may take small sips of cool drinks or ice chips  Use a sling to protect your arm if needed     Medications used:  Lidocaine  Depo-medrol  Omnipaque  Ropivicaine           Be cautious when walking. Numbness and/or weakness in the lower extremities may occur up to 6-8 hours after the procedure due to effect of the local anesthetic    Do not drive for 6 hours. The effect of the local anesthetic could slow your reflexes.     You may resume your regular activities after 24 hours    Avoid strenuous activity for the first 24 hours    You may shower, however avoid swimming, tub baths or hot tubs for 24 hours following your procedure    You may have a mild to moderate increase in pain for several days following the injection.    It may take up to 14 days for the steroid medication to start working although you may feel the effect as early as a few days after the procedure.       You may use ice packs for 10-15  minutes, 3 to 4 times a day at the injection site for comfort    Do not use heat to painful areas for 6 to 8 hours. This will give the local anesthetic time to wear off and prevent you from accidentally burning your skin.     You may use anti-inflammatory medications (such as Ibuprofen or Aleve or Advil) or Tylenol for pain control if necessary    If you were fasting, you may resume your normal diet and medications after the procedure    If you experience any of the following, call the pain center nursing line during work hours at [phone] or the after hours provider line at [phone]:  -Fever over 100 degree F  -Swelling, bleeding, redness, drainage, warmth at the injection site  -Progressive weakness or numbness in your legs or arms  -Unusual headache that is not relieved by Tylenol  -Unusual new onset of pain that is not improving      Phone #s:  Appointment line: [phone];  Nurse line: [phone]            Follow-ups after your visit        Your next 10 appointments already scheduled     May 12, 2017  2:30 PM CDT   Return Visit with DAYANNA Wilkins CNP   [facility] ([facility])    [address] [place], [place] [address]  [facility] [address]  [facility] [address] [phone]              Who to contact     If you have questions or need follow up information about today's clinic visit or your schedule please contact [facility] directly at [phone].  Normal or non-critical lab and imaging results will be communicated to you by MyChart, letter or phone within 4 business days after the clinic has received the results. If you do not hear from us within 7 days, please contact the clinic through MyChart or phone. If you have a critical or abnormal lab result, we will notify you by phone as soon as possible.  Submit refill requests through [employer] or call your pharmacy and they will forward the refill request to us. Please allow 3 business days for your refill to be  completed.          Additional Information About Your Visit        [employer] Information     [employer] gives you secure access to your electronic health record. If you see a primary care provider, you can also send messages to your care team and make appointments. If you have questions, please call your primary care clinic.  If you do not have a primary care provider, please call [phone] and they will assist you.        Care EveryWhere ID     This is your Care EveryWhere ID. This could be used by other organizations to access your [facility] medical records  [id number]        Your Vitals Were     Pulse Pulse Oximetry                77 99%           Blood Pressure from Last 3 Encounters:   04/25/17 135/86   03/31/17 (!) 151/97   03/28/17 (P) 126/84    Weight from Last 3 Encounters:   03/31/17 74.8 kg (165 lb)   12/01/16 75.8 kg (167 lb)   11/03/16 73.9 kg (163 lb)              Today, you had the following     No orders found for display       Primary Care Provider Office Phone # Fax #    [facility] [phone] [address] [facility] [address]        Thank you!     Thank you for choosing [facility]  for your care. Our goal is always to provide you with excellent care. Hearing back from our patients is one way we can continue to improve our services. Please take a few minutes to complete the written survey that you may receive in the mail after your visit with us. Thank you!             Your Updated Medication List - Protect others around you: Learn how to safely use, store and throw away your medicines at www.disposemymeds.org.          This list is accurate as of: 4/25/17 12:35 PM.  Always use your most recent med list.                   Brand Name Dispense Instructions for use    cyclobenzaprine 5 MG tablet    FLEXERIL    60 tablet    Take 1 tablet (5 mg) by mouth 3 times daily as needed for muscle spasms (If causes sedation just take at bedtime.)        IBUPROFEN PO      Take 1 tablet by mouth every 8 hours as needed for moderate pain       * lidocaine 5 % ointment    XYLOCAINE    100 g    Apply topically as needed for moderate pain       * lidocaine 5 % Patch    LIDODERM    90 patch    1-3 patches daily as needed. Have patches on for 12 hours and off for 12 hours.       MAGNESIUM OXIDE PO          MULTIVITAMIN PO      Take by mouth daily       neomycin-polymyxin-dexamethasone [id number] Susp ophthalmic susp    MAXITROL     Place 1 drop into both eyes 4 times daily       * order for DME     1 Units    Equipment being ordered: Neoprene or similar type of wrist/arm compression sleeve. Get largest size so it is not too tight.       * order for DME     1 Units    Equipment being ordered: TENS       * Notice:  This list has 4 medication(s) that are the same as other medications prescribed for you. Read the directions carefully, and ask your doctor or other care provider to review them with you.

## 2017-04-25 NOTE — NURSING NOTE
Discharge Information    IV Discontiued Time: 1245 catheter intact    Amount of Fluid Infused:  NA    Discharge Criteria = When patient returns to baseline or as per MD order    Consciousness:  Pt is fully awake    Circulation:  BP +/- 20% of pre-procedure level    Respiration:  Patient is able to breathe deeply    O2 Sat:  Patient is able to maintain O2 Sat >92% on room air    Activity:  Moves 4 extremities on command    Ambulation:  Patient is able to stand and walk or stand and pivot into wheelchair    Dressing:  Clean/dry or No Dressing    Notes:   Discharge instructions and AVS given to patient    Patient meets criteria for discharge?  YES    Admitted to PCU?  No    Responsible adult present to accompany patient home?  Yes    Signature/Title:    Cindi Sethi RN Care Coordinator  [facility]

## 2017-04-25 NOTE — PATIENT INSTRUCTIONS
[facility]   Procedure Discharge Instructions    Today you saw:  Dr. Jayro Brand     Stellate Ganglion Block:  You may have one or more of the following: difficulty swallowing, hoarseness, sore throat, mild swelling or bruising or the neck, drooping or redness of the eyelid on the side injected, a very small pupil on the side injected, weakness of the arm on the side injected.  These symptons should go away within 24 hours  If you experience chest pain, shortness of breath or problems breathing go to your local Emergency Room    Do not eat until the lump in your throat goes down (usually about 4 hours)  Do not drink hot drinks for the next 12 hours  You may take small sips of cool drinks or ice chips  Use a sling to protect your arm if needed     Medications used:  Lidocaine  Depo-medrol  Omnipaque  Ropivicaine           Be cautious when walking. Numbness and/or weakness in the lower extremities may occur up to 6-8 hours after the procedure due to effect of the local anesthetic    Do not drive for 6 hours. The effect of the local anesthetic could slow your reflexes.     You may resume your regular activities after 24 hours    Avoid strenuous activity for the first 24 hours    You may shower, however avoid swimming, tub baths or hot tubs for 24 hours following your procedure    You may have a mild to moderate increase in pain for several days following the injection.    It may take up to 14 days for the steroid medication to start working although you may feel the effect as early as a few days after the procedure.       You may use ice packs for 10-15 minutes, 3 to 4 times a day at the injection site for comfort    Do not use heat to painful areas for 6 to 8 hours. This will give the local anesthetic time to wear off and prevent you from accidentally burning your skin.     You may use anti-inflammatory medications (such as Ibuprofen or Aleve or Advil) or Tylenol for pain control if  necessary    If you were fasting, you may resume your normal diet and medications after the procedure    If you experience any of the following, call the pain center nursing line during work hours at [phone] or the after hours provider line at [phone]:  -Fever over 100 degree F  -Swelling, bleeding, redness, drainage, warmth at the injection site  -Progressive weakness or numbness in your legs or arms  -Unusual headache that is not relieved by Tylenol  -Unusual new onset of pain that is not improving      Phone #s:  Appointment line: [phone];  Nurse line: [phone]

## 2017-05-12 ENCOUNTER — OFFICE VISIT (OUTPATIENT)
Dept: PALLIATIVE MEDICINE | Facility: CLINIC | Age: 27
End: 2017-05-12
Payer: COMMERCIAL

## 2017-05-12 VITALS
OXYGEN SATURATION: 97 % | BODY MASS INDEX: 23.46 KG/M2 | DIASTOLIC BLOOD PRESSURE: 89 MMHG | WEIGHT: 162 LBS | SYSTOLIC BLOOD PRESSURE: 135 MMHG | HEART RATE: 96 BPM | RESPIRATION RATE: 16 BRPM

## 2017-05-12 DIAGNOSIS — G90.511 COMPLEX REGIONAL PAIN SYNDROME TYPE 1 OF RIGHT UPPER EXTREMITY: Primary | ICD-10-CM

## 2017-05-12 DIAGNOSIS — G56.41 COMPLEX REGIONAL PAIN SYNDROME TYPE 2 OF RIGHT UPPER EXTREMITY: ICD-10-CM

## 2017-05-12 DIAGNOSIS — M25.511 RIGHT SHOULDER PAIN, UNSPECIFIED CHRONICITY: ICD-10-CM

## 2017-05-12 PROCEDURE — T1013 SIGN LANG/ORAL INTERPRETER: HCPCS | Mod: U3 | Performed by: NURSE PRACTITIONER

## 2017-05-12 PROCEDURE — 99214 OFFICE O/P EST MOD 30 MIN: CPT | Performed by: NURSE PRACTITIONER

## 2017-05-12 RX ORDER — PREGABALIN 50 MG/1
50 CAPSULE ORAL 2 TIMES DAILY
Qty: 50 CAPSULE | Refills: 1 | Status: SHIPPED | OUTPATIENT
Start: 2017-05-12 | End: 2019-07-10

## 2017-05-12 ASSESSMENT — PAIN SCALES - GENERAL: PAINLEVEL: EXTREME PAIN (8)

## 2017-05-12 NOTE — LETTER
[facility]    REPORT OF WORK ABILITY        PATIENT DATA     Employee Name: Denton Hines : 1990   #: [id number]     Work related injury: Yes  Employer at time of injury: [employer]  Employer contact & phone: [phone]  Employed elsewhere? No     Today's date: 17  Date of injury: 7/28/15  Date of first visit: 16 (with DAYANNA Rashid, NP-C)      PROVIDER EVALUATION: Please fill in as needed. Please give copy to employee for employer.     1. Diagnosis: Complex Regional Pain Syndrome type 2     2. Treatment: block injections, therapies, medications have all been tried.  3. Medication: ibuprofen at this time  NOTE: When ordering a medication, [place] Rules require Work Comp or WC on prescriptions.  4. Return to work date: now   ** WITH RESTRICTIONS? Yes, with work restrictions: * Other: No use of right hand for grasping type activities or lifting type activities. Avoid exposure to cold temperatures.      DURATION OF LIMITATIONS: unknown at this time     RESTRICTIONS: See above     Maximum Medical Improvement (Date): unknown  Any Permanent Partial Disability? Unknown     Provider comments: Patient has been compliant with all recommended medical treatments.      Medical Examiner: GREGORY Lazo, DAYANNA   License or registration: CNP [id number]     Next appointment: Seen every 4-6 weeks with this provider.      DAYANNA Rashid, NP-C  [facility]

## 2017-05-12 NOTE — MR AVS SNAPSHOT
After Visit Summary   5/12/2017    Denton Hines    MRN: [id number]           Patient Information     Date Of Birth          1990        Visit Information        Provider Department      5/12/2017 2:15 PM Shila Hampton Dawn Marie, APRN CNP [facility]        Today's Diagnoses     Complex regional pain syndrome type 1 of right upper extremity    -  1    Right shoulder pain, unspecified chronicity        Complex regional pain syndrome type 2 of right upper extremity          Care Instructions    After Visit Instructions:     Thank you for coming to [facility] for your care. It is my goal to partner with you to help you reach your optimal state of health.     I am recommending multidisciplinary care at this time.  The focus of care will be to continue gradual rehabilitation and pain management with medication adjustments as needed.    Continue daily self-care, identifying contributing factors, and monitoring variations in pain level. Continue to integrate self-care into your life.      1. Continue physical therapy and hand therapy visits   2. Schedule follow-up with DAYANNA Lazo NP-C in 4 weeks or sooner as needed.   3. Procedures recommended: Stellate ganglion block when you are ready.    Interventional procedures  are done at our [place] and [place] locations.   4. Order for TENS unit given  5. Medication recommendations:   1. Lyrica 50 mg: take one capsule at bedtime for 1 week, then add another capsule in the morning.       DAYANNA Rashid NP-C  [facility]    Contact information: [facility]    Please call if any side effects, questions, or concerns arise.    Nurse Triage line:  [phone]   Call this number with any questions or concerns. You may leave a detailed message anytime. Calls are typically returned Monday through Friday between 8 AM and 4:30 PM. We usually  get back to you within 2 business days depending on the issue/request.    Scheduling number: [phone].  Call this number to schedule or change appointments.          Medication Refills Policy:    For non-narcotic medications, please your pharmacy directly to request a refill and the pharmacy will call the [facility] Management Center for authorization. Please allow 3-4 days for these refills.    For narcotic refills, call the nurse triage line and leave a message requesting your refill along with the name of the pharmacy that you use. Narcotic prescriptions will be mailed to your pharmacy or you may pick them up at the clinic.  Please call 7-10 days before your refill is due  The above policy allows adequate time so that you do not run out of medication.    No Show - Late Cancellation - Late Arrival Policy  We believe regular attendance is key to your success in our program.    Any time you are unable to keep your appointment we ask that you call us at  least 24 hours in advance to let us know. This will allow us to offer the appointment time to another patient. The following is our policy for missed appointments. This also applies to appointments cancelled with less than 24 hours notice.    You will receive a letter after missing your 1st and 2nd appointments without contacting the clinic before your scheduled appointment time.     After missing 3 appointments without calling first, we will cancel all of your future appointments at [facility].    At that point, you will not be able to resume services unless approved by your care team  We understand that unforseen circumstances arise, however, out of respect for all concerned and to provide this appointment to another patient, this policy will be enforced.    Please note that most follow up appointments are 30 minutes long. If you arrive late, your provider may not be able to see you for the entire 30 minutes. Please also note that if you arrive  more than 15 minutes for any appointment, it may be rescheduled.                                   Follow-ups after your visit        Additional Services     Other specialty referral       Your provider has referred you to: [facility]. CRPS of RUE and right shoulder pain.    Please be aware that coverage of these services is subject to the terms and limitations of your health insurance plan.  Call member services at your health plan with any benefit or coverage questions.      Please inform our clinic Referral Specialist of any tests that you may have already completed.    Please bring the following with you to your appointment:    (1) Any X-Rays, CTs or MRIs which have been performed.  Contact the facility where they were done to arrange for  prior to your scheduled appointment.  Any new CT, MRI or other procedures ordered by your specialist must be performed at a [facility] facility or coordinated by your clinic's referral office.    (2) List of current medications   (3) This referral request   (4) Any documents/labs given to you for this referral                  Who to contact     If you have questions or need follow up information about today's clinic visit or your schedule please contact [facility] directly at [phone].  Normal or non-critical lab and imaging results will be communicated to you by MyChart, letter or phone within 4 business days after the clinic has received the results. If you do not hear from us within 7 days, please contact the clinic through [employer] or phone. If you have a critical or abnormal lab result, we will notify you by phone as soon as possible.  Submit refill requests through [employer] or call your pharmacy and they will forward the refill request to us. Please allow 3 business days for your refill to be completed.          Additional Information About Your Visit        [employer] Information     [employer] gives you secure access  to your electronic health record. If you see a primary care provider, you can also send messages to your care team and make appointments. If you have questions, please call your primary care clinic.  If you do not have a primary care provider, please call [phone] and they will assist you.        Care EveryWhere ID     This is your Care EveryWhere ID. This could be used by other organizations to access your [facility] medical records  [id number]        Your Vitals Were     Pulse Respirations Pulse Oximetry BMI (Body Mass Index)          96 16 97% 23.46 kg/m2         Blood Pressure from Last 3 Encounters:   05/12/17 135/89   04/25/17 135/86   03/31/17 (!) 151/97    Weight from Last 3 Encounters:   05/12/17 73.5 kg (162 lb)   03/31/17 74.8 kg (165 lb)   12/01/16 75.8 kg (167 lb)              We Performed the Following     Other specialty referral          Today's Medication Changes          These changes are accurate as of: 5/12/17  2:54 PM.  If you have any questions, ask your nurse or doctor.               Start taking these medicines.        Dose/Directions    pregabalin 50 MG capsule   Commonly known as:  LYRICA   Used for:  Complex regional pain syndrome type 1 of right upper extremity   Started by:  Lulu Fields APRN CNP        Dose:  50 mg   Take 1 capsule (50 mg) by mouth 2 times daily   Quantity:  50 capsule   Refills:  1            Where to get your medicines      Some of these will need a paper prescription and others can be bought over the counter.  Ask your nurse if you have questions.     Bring a paper prescription for each of these medications     order for DME    pregabalin 50 MG capsule                Primary Care Provider Office Phone # Fax #    [facility] [phone] [address] [address]        Thank you!     Thank you for choosing [facility]  for your care. Our goal is always to provide you with excellent care.  Hearing back from our patients is one way we can continue to improve our services. Please take a few minutes to complete the written survey that you may receive in the mail after your visit with us. Thank you!             Your Updated Medication List - Protect others around you: Learn how to safely use, store and throw away your medicines at www.disposemymeds.org.          This list is accurate as of: 5/12/17  2:54 PM.  Always use your most recent med list.                   Brand Name Dispense Instructions for use    cyclobenzaprine 5 MG tablet    FLEXERIL    60 tablet    Take 1 tablet (5 mg) by mouth 3 times daily as needed for muscle spasms (If causes sedation just take at bedtime.)       IBUPROFEN PO      Take 1 tablet by mouth every 8 hours as needed for moderate pain       * lidocaine 5 % ointment    XYLOCAINE    100 g    Apply topically as needed for moderate pain       * lidocaine 5 % Patch    LIDODERM    90 patch    1-3 patches daily as needed. Have patches on for 12 hours and off for 12 hours.       MAGNESIUM OXIDE PO          MULTIVITAMIN PO      Take by mouth daily       neomycin-polymyxin-dexamethasone 3.5-04740-3.1 Susp ophthalmic susp    MAXITROL     Place 1 drop into both eyes 4 times daily       * order for DME     1 Units    Equipment being ordered: Neoprene or similar type of wrist/arm compression sleeve. Get largest size so it is not too tight.       * order for DME     1 Units    Equipment being ordered: TENS       pregabalin 50 MG capsule    LYRICA    50 capsule    Take 1 capsule (50 mg) by mouth 2 times daily       * Notice:  This list has 4 medication(s) that are the same as other medications prescribed for you. Read the directions carefully, and ask your doctor or other care provider to review them with you.

## 2017-05-12 NOTE — NURSING NOTE
"Chief Complaint   Patient presents with     Pain       Initial /89 (BP Location: Left arm, Patient Position: Chair, Cuff Size: Adult Small)  Pulse 96  Resp 16  Wt 73.5 kg (162 lb)  SpO2 97%  BMI 23.46 kg/m2 Estimated body mass index is 23.46 kg/(m^2) as calculated from the following:    Height as of 7/5/16: 1.77 m (5' 9.69\").    Weight as of this encounter: 73.5 kg (162 lb).  Medication Reconciliation: complete       Guru Trevizo MA  [facility]      "

## 2017-05-12 NOTE — PROGRESS NOTES
"[facility]    CHIEF COMPLAINT: Right arm pain     INTERVAL HISTORY:  Last seen on 3/31/17.        Recommendations/plan at the last visit included:  1. Schedule pain psychology visit. Contact information given  2. Continue occupational therapy visits  3. Schedule follow-up with DAYANNA Lazo NP-C in 6 weeks  4. Procedures recommended: Continue Stellate ganglion blocks every 2-3 weeks   5. Release of information: For Lulu to talk Dr Faith  6. Medication recommendations: Cymbalta. Call Lulu or send message if you would like to start this medication. It treats pain and depression.   7. Work restrictions per 2/22/17. No changes    Since his last visit, Denton Hines reports:  - Had stellate ganglion block on 4/25/17. Went well but with insurance issues he is not sure if it will be covered. He is also concerned about potential complications with injections.   - Work comp denied.  is appealing this decision.     Pain Information:   Pain quality: Burning, Exhausting, Numb, Shooting, Stabbing and Throbbing    Pain timing:Constant and variable    Pain rating: intensity ranges from 3/10 to 10/10, and averages 8/10 on a 0-10 scale.   Aggravating factors include: \"Using my arm\"   Relieving factors include: \"Massage arm, rest\"    Annual Controlled Substance Agreement/UDS due date: n/a    CURRENT RELEVANT PAIN MEDICATIONS:   Occasional OTC pain relivers    Patient is using the medication as prescribed:  N/A  Is your medication helpful? minimally   Medication side effects? denies any problems      Previous Pain Relevant Medications: (H--helped; HI--Helped initially; SWH--Somewhat helpful; NH--No help; W--worse; SE--side effects; ?--Unsure if helpful)   NOTE: This medication information taken from patient's intake form, not medical records.   Opiates: Hydrocodone: H, Oxycodone: H  NSAIDS: Ibuprofen: H for headaches  Muscle Relaxants: none  Anti-migraine mediations: none  Anti-depressants: " none  Sleep aids: none  Anti-convulsants: Gabapentin: NH, Sedation   Topicals: none  Other medications not covered above: non     Any illicit drug use: denies  Any use of prescriptions other than how they were prescribed:carlos alberto  [place] Board of Pharmacy Data Base Reviewed: YES; No concern for abuse or misuse of controlled medications based on this report.      SELF CARE:   How often do you practice SELF-CARE (relaxing, stretching, pacing, monitoring posture, taking mini-breaks) in a typical day: 2-3x per week    Medications:  Current Outpatient Prescriptions   Medication Sig Dispense Refill     pregabalin (LYRICA) 50 MG capsule Take 1 capsule (50 mg) by mouth 2 times daily 50 capsule 1     order for DME Equipment being ordered: TENS 1 Units 0     neomycin-polymyxin-dexamethasone (MAXITROL) 3.5-79373-1.1 SUSP ophthalmic susp Place 1 drop into both eyes 4 times daily       cyclobenzaprine (FLEXERIL) 5 MG tablet Take 1 tablet (5 mg) by mouth 3 times daily as needed for muscle spasms (If causes sedation just take at bedtime.) 60 tablet 3     lidocaine (LIDODERM) 5 % patch 1-3 patches daily as needed. Have patches on for 12 hours and off for 12 hours. 90 patch 0     lidocaine (XYLOCAINE) 5 % ointment Apply topically as needed for moderate pain 100 g 3     IBUPROFEN PO Take 1 tablet by mouth every 8 hours as needed for moderate pain       MAGNESIUM OXIDE PO        order for DME Equipment being ordered: Neoprene or similar type of wrist/arm compression sleeve. Get largest size so it is not too tight. 1 Units 0     Multiple Vitamins-Minerals (MULTIVITAMIN PO) Take by mouth daily         Review of Systems: A 10-point review of systems was negative, with the exception of chronic pain issues.      Social History: Reviewed; unchanged from initial consultation.      Family history: Reviewed; unchanged from initial consultation.     PHYSICAL EXAM    Vitals:    05/12/17 1416   BP: 135/89   BP Location: Left arm   Patient  Position: Chair   Cuff Size: Adult Small   Pulse: 96   Resp: 16   SpO2: 97%   Weight: 73.5 kg (162 lb)       Constitutional: healthy, alert and mild emotional distress, frustrated, somewhat depressed, tearful.  HEENT: Head atraumatic, normocephalic. Eyes without conjunctival injection or jaundice. Neck supple. No obvious neck masses.  Skin: No rash, lesions, or petechiae of exposed skin.   Extremities: Peripheral pulses intact. RUE slightly cooler to the touch and more red in color as compared to LUE. Skin on right hand noticeably more dry and rough and left hand.  Psychiatric/mental status: Alert, without lethargy or stupor. Appropriate affect. Mood normal.   Neurologic exam:  CN: Cranial nerves 2-12 are grossly normal.  Motor:  5/5 LUE and 1/5 hand strength     DIRE Score for ongoing opioid management is calculated as follows:  Diagnosis = 2 pts (slowly progressive; moderate pain/objective findings)  Intractability = 3 pts (patient fully engaged but inadequate response to treatments)  Risk   Psych = 3 pts (no significant personality dysfunction/mental illness; good communication with clinic)   Chem Hlth = 3 pts (no history of chemical dependency; not drug-focused)  Reliability = 3 pts (highly reliable with meds, appointments, treatments)  Social = 3 pts (supportive family/close relationships; involved in work/school; no isolation) Unable to work due to medical condition but otherwise fully engaged.   (Psych + Chem hlth + Reliability + Social) = 17  Efficacy = 2 pts (moderate benefit/function; low med dose; too early/not tried meds)     DIRE Score = 19 Patient is not interested in opioid analgesia.    7-13: likely NOT suitable candidate for long-term opioid analgesia  14-21: may be a suitable candidate for long-term opioid analgesia    [facility] Criteria for diagnosis of Complex Regional Pain Syndrome:    Continuing pain disproportionate to inciting event: positive for pain  Must report one symptom (subjective) in  two of the following four categories:   Sensory (hyperalgesia or allodynia): positive for hyperalgesia   Vasomotor (temperature asymmetry, skin color changes, skin color asymmetry): positive for temperature asymmetry, skin color changes, skin color asymmetry   Pseudomotor/edema (edema, sweating or sweating asymmetry): Negative   Motor/trophic (decreased range of motion, motor dysfunction, trophic changes in skin, hair or nails): positive for Decreased ROM, motor dysfunction, trophic changes in skin   Must display (objective) one sign in two or more of the following four categories:   Sensory (hyperalgesia or allodynia): positive for hyperalgesia   Vasomotor (temperature asymmetry, skin color changes, skin color asymmetry): positive for temperature asymmetry, skin color changes, skin color asymmetry   Pseudomotor/edema (edema, sweating or sweating asymmetry): negative   Motor/trophic (decreased range of motion, motor dysfunction, trophic changes in skin, hair or nails): positive for positive for Decreased ROM, motor dysfunction, trophic changes in skin   Patient does meet [place] Criteria for Complex Regional Pain Syndrome      DIAGNOSTIC TESTS:  Imaging Studies:   No new imaging     Assessment:   1. CRPS of right upper extremity.   2. Right shoulder strain secondary to positioning related to CRPS  3. Situational Depression    Denton returns in the company of an . He no longer has work comp so the [facility] rep is not here today. His  is appealing the legal decision to deny w/c claim. Denton is concerned about possible sequelae of continuing stellate ganglion blocks. He is will to try Lyrica again due to severity of his pain.     [facility] exam [facility].     Plan:    Diagnosis reviewed, treatment option addressed, and risk/benifits discussed.  Self-care instructions given.  I am recommending a multidisciplinary treatment plan to help this patient better manage pain.        1. Continue  physical therapy and hand therapy visits. Order provided.   2. Schedule follow-up with DAYANNA Lazo, NPTamaraC in 4 weeks or sooner as needed.   3. Procedures recommended: Stellate ganglion block when you are ready.    Interventional procedures  are done at our [place] and [place] locations.   4. Order for TENS unit given  5. Medication recommendations:   1. Lyrica 50 mg: take one capsule at bedtime for 1 week, then add another capsule in the morning.     Total time spent face to face was 30 minutes and more than 50% of face to face time was spent in counseling and/or coordination of care regarding the diagnosis and recommendations above.      DAYANNA Rashid, NP-C   [facility]

## 2017-05-12 NOTE — PATIENT INSTRUCTIONS
After Visit Instructions:     Thank you for coming to [facility] for your care. It is my goal to partner with you to help you reach your optimal state of health.     I am recommending multidisciplinary care at this time.  The focus of care will be to continue gradual rehabilitation and pain management with medication adjustments as needed.    Continue daily self-care, identifying contributing factors, and monitoring variations in pain level. Continue to integrate self-care into your life.      1. Continue physical therapy and hand therapy visits   2. Schedule follow-up with DAYANNA Lazo NP-C in 4 weeks or sooner as needed.   3. Procedures recommended: Stellate ganglion block when you are ready.    Interventional procedures  are done at our [place] and [place] locations.   4. Order for TENS unit given  5. Medication recommendations:   1. Lyrica 50 mg: take one capsule at bedtime for 1 week, then add another capsule in the morning.       DAYANNA Rashid NP-C  [facility]  [facility]    Contact information: [facility]    Please call if any side effects, questions, or concerns arise.    Nurse Triage line:  [phone]   Call this number with any questions or concerns. You may leave a detailed message anytime. Calls are typically returned Monday through Friday between 8 AM and 4:30 PM. We usually get back to you within 2 business days depending on the issue/request.    Scheduling number: [phone].  Call this number to schedule or change appointments.          Medication Refills Policy:    For non-narcotic medications, please your pharmacy directly to request a refill and the pharmacy will call the Pain Management Center for authorization. Please allow 3-4 days for these refills.    For narcotic refills, call the nurse triage line and leave a message requesting your refill along with the name of the pharmacy that you use. Narcotic  prescriptions will be mailed to your pharmacy or you may pick them up at the clinic.  Please call 7-10 days before your refill is due  The above policy allows adequate time so that you do not run out of medication.    No Show - Late Cancellation - Late Arrival Policy  We believe regular attendance is key to your success in our program.    Any time you are unable to keep your appointment we ask that you call us at  least 24 hours in advance to let us know. This will allow us to offer the appointment time to another patient. The following is our policy for missed appointments. This also applies to appointments cancelled with less than 24 hours notice.    You will receive a letter after missing your 1st and 2nd appointments without contacting the clinic before your scheduled appointment time.     After missing 3 appointments without calling first, we will cancel all of your future appointments at [facility].    At that point, you will not be able to resume services unless approved by your care team  We understand that unforseen circumstances arise, however, out of respect for all concerned and to provide this appointment to another patient, this policy will be enforced.    Please note that most follow up appointments are 30 minutes long. If you arrive late, your provider may not be able to see you for the entire 30 minutes. Please also note that if you arrive more than 15 minutes for any appointment, it may be rescheduled.

## 2017-05-15 ENCOUNTER — TELEPHONE (OUTPATIENT)
Dept: PALLIATIVE MEDICINE | Facility: CLINIC | Age: 27
End: 2017-05-15

## 2017-05-15 NOTE — TELEPHONE ENCOUNTER
VM left today at: 11:49 am    Brandt from [facility]? Called.   Patient is requesting Lyrica and tens unit.  Requesting we fax or e-scribe medication.    Ph. [phone]  Fax: [phone]    Gisella RUANO    [facility]

## 2017-05-15 NOTE — TELEPHONE ENCOUNTER
Faxed the Rx to the [facility] pharmacy. This pharmacy is not on the pt's pharmacy list and I originally faxed the Rx to the [facility]'s pharmacy.       Guru Trevizo MA  [facility]

## 2017-05-17 NOTE — TELEPHONE ENCOUNTER
VM left today at: 11:44 am    Jennifer from [employer] Calling to check on status of narrative request.  Can be reached at [phone]    Gisella RUANO    [facility]

## 2017-05-18 ENCOUNTER — TELEPHONE (OUTPATIENT)
Dept: PALLIATIVE MEDICINE | Facility: CLINIC | Age: 27
End: 2017-05-18

## 2017-05-18 NOTE — TELEPHONE ENCOUNTER
Called and LM with Jennifer letting her know that the person who was working on this paperwork was out of the office until next week. LM that we would get back to her once we knew more about the status.      SHANIA OrtegaN, RN-BC  Patient Care Supervisor/Care Coordinator  [facility]

## 2017-05-18 NOTE — TELEPHONE ENCOUNTER
Gisella Grigsby at 5/17/2017  1:10 PM   Status: Signed          VM left today at: 11:44 am     Jennifer from [employer] Calling to check on status of narrative request.  Can be reached at [phone]     Gisella RUANO    [facility]           From earlier telephone encounter that had been closed:    4/1817: LINDA Love wrote:  Paperwork received from law office of [employer]. Requesting narrative and medical records. Medical record request faxed to  medical records. RightFax confirmed. Paperwork put in Dr. Anais Brand's in-basket.    4/18/17: Paperwork submitted to [employer]. Will await their recommendations. Jayro Brand MD  [facility]

## 2017-05-25 NOTE — TELEPHONE ENCOUNTER
Jennifer from [employer] Calling again to check on status of narrative request.  Can be reached at [phone]

## 2017-05-25 NOTE — TELEPHONE ENCOUNTER
Spoke to Jennifer. Explained that we were still waiting on admin to give us the approval to go ahead. Stated hoping to receive that soon. She stated thank you for the update.     Naye Sethi RN, [facility]  Pain Clinic Care Coordinator

## 2017-05-26 ENCOUNTER — TELEPHONE (OUTPATIENT)
Dept: PALLIATIVE MEDICINE | Facility: CLINIC | Age: 27
End: 2017-05-26

## 2017-05-26 NOTE — TELEPHONE ENCOUNTER
Jennifer calling on behalf of  [employer].  Wanting to speak to Lulu Fields directly in regards to narrative request.   He's wanting to set up a telephone meeting.      His direct number is [phone].     Giselal RUANO    [facility]

## 2017-05-31 NOTE — TELEPHONE ENCOUNTER
Debbi Cunningham,  has been in touch with the 's office.     DAYANNA Rashid, NP-C  [facility]

## 2017-07-12 NOTE — TELEPHONE ENCOUNTER
VM left at 3:31pm:    Jennifer from [employer] checking the status of the narrative request. Request a call back [phone]

## 2017-07-12 NOTE — TELEPHONE ENCOUNTER
Jennifer from [employer] [facility], checking on the status of narrative request from Dr. Anais Brand. Requests a call back.

## 2017-07-14 ENCOUNTER — OFFICE VISIT (OUTPATIENT)
Dept: PALLIATIVE MEDICINE | Facility: CLINIC | Age: 27
End: 2017-07-14
Payer: OTHER MISCELLANEOUS

## 2017-07-14 VITALS
SYSTOLIC BLOOD PRESSURE: 133 MMHG | DIASTOLIC BLOOD PRESSURE: 82 MMHG | OXYGEN SATURATION: 97 % | WEIGHT: 162 LBS | RESPIRATION RATE: 16 BRPM | BODY MASS INDEX: 23.46 KG/M2 | HEART RATE: 92 BPM

## 2017-07-14 DIAGNOSIS — G90.511 COMPLEX REGIONAL PAIN SYNDROME TYPE 1 OF RIGHT UPPER EXTREMITY: Primary | ICD-10-CM

## 2017-07-14 PROCEDURE — 99214 OFFICE O/P EST MOD 30 MIN: CPT | Performed by: NURSE PRACTITIONER

## 2017-07-14 PROCEDURE — T1013 SIGN LANG/ORAL INTERPRETER: HCPCS | Mod: U3 | Performed by: NURSE PRACTITIONER

## 2017-07-14 ASSESSMENT — PAIN SCALES - GENERAL: PAINLEVEL: SEVERE PAIN (7)

## 2017-07-14 NOTE — NURSING NOTE
"Chief Complaint   Patient presents with     Pain       Initial /82 (BP Location: Left arm, Patient Position: Chair, Cuff Size: Adult Regular)  Pulse 92  Resp 16  Wt 73.5 kg (162 lb)  SpO2 97%  BMI 23.46 kg/m2 Estimated body mass index is 23.46 kg/(m^2) as calculated from the following:    Height as of 7/5/16: 1.77 m (5' 9.69\").    Weight as of this encounter: 73.5 kg (162 lb).  Medication Reconciliation: complete       Guru Trevizo MA  [facility]      "

## 2017-07-14 NOTE — TELEPHONE ENCOUNTER
Left voice mail message for Jennifer at [phone] stating that Lulu Fields's narrative was faxed today and that Dr. Carlito Brand is aware the DODIE is in patient chart and will start to complete narrative.  Naye Sethi RN, is assisting with the process.  If there are questions/concerns, call Debbi Cunningham,  at [phone].

## 2017-07-14 NOTE — MR AVS SNAPSHOT
After Visit Summary   7/14/2017    Denton Hines    MRN: [id number]           Patient Information     Date Of Birth          1990        Visit Information        Provider Department      7/14/2017 3:00 PM Dany Alvarez; Lulu Fields APRN CNP [facility]        Care Instructions    After Visit Instructions:     Thank you for coming to [facility] for your care. It is my goal to partner with you to help you reach your optimal state of health.     I am recommending multidisciplinary care at this time.  The focus of care will be to continue gradual rehabilitation and pain management with medication adjustments as needed.    Continue daily self-care, identifying contributing factors, and monitoring variations in pain level. Continue to integrate self-care into your life.      1. Schedule physical therapy visits   2. Schedule follow-up with DAYANNA Lazo NP-C in 2 months  3. Procedures recommended: Continue stellate ganglion blocks   Interventional procedures  are done at our [place] and [place] locations.   4. Medication recommendations:   1. Stop Lyrica  2. Look up Protriptyline. If you decide that you want to start this medication, let Lulu know.       DAYANNA Rashid, NP-C  [facility]    Contact information: [facility]    Please call if any side effects, questions, or concerns arise.    Nurse Triage line:  [phone]   Call this number with any questions or concerns. You may leave a detailed message anytime. Calls are typically returned Monday through Friday between 8 AM and 4:30 PM. We usually get back to you within 2 business days depending on the issue/request.    Scheduling number: [phone].  Call this number to schedule or change appointments.          Medication Refills Policy:    For non-narcotic medications, please your pharmacy directly to request a refill  and the pharmacy will call the Pain Management Center for authorization. Please allow 3-4 days for these refills.    For narcotic refills, call the nurse triage line and leave a message requesting your refill along with the name of the pharmacy that you use. Narcotic prescriptions will be mailed to your pharmacy or you may pick them up at the clinic.  Please call 7-10 days before your refill is due  The above policy allows adequate time so that you do not run out of medication.    No Show - Late Cancellation - Late Arrival Policy  We believe regular attendance is key to your success in our program.    Any time you are unable to keep your appointment we ask that you call us at  least 24 hours in advance to let us know. This will allow us to offer the appointment time to another patient. The following is our policy for missed appointments. This also applies to appointments cancelled with less than 24 hours notice.    You will receive a letter after missing your 1st and 2nd appointments without contacting the clinic before your scheduled appointment time.     After missing 3 appointments without calling first, we will cancel all of your future appointments at [facility].    At that point, you will not be able to resume services unless approved by your care team  We understand that unforseen circumstances arise, however, out of respect for all concerned and to provide this appointment to another patient, this policy will be enforced.    Please note that most follow up appointments are 30 minutes long. If you arrive late, your provider may not be able to see you for the entire 30 minutes. Please also note that if you arrive more than 15 minutes for any appointment, it may be rescheduled.                                     Follow-ups after your visit        Your next 10 appointments already scheduled     Aug 03, 2017  2:15 PM CDT   Radiology Injections with Jayro Brand MD   [facility]  [place] ([facility] [place])    [address]  [place] [address] [phone]              Who to contact     If you have questions or need follow up information about today's clinic visit or your schedule please contact [facility] directly at [phone].  Normal or non-critical lab and imaging results will be communicated to you by MyChart, letter or phone within 4 business days after the clinic has received the results. If you do not hear from us within 7 days, please contact the clinic through MyChart or phone. If you have a critical or abnormal lab result, we will notify you by phone as soon as possible.  Submit refill requests through [employer] or call your pharmacy and they will forward the refill request to us. Please allow 3 business days for your refill to be completed.          Additional Information About Your Visit        MyChart Information     [employer] gives you secure access to your electronic health record. If you see a primary care provider, you can also send messages to your care team and make appointments. If you have questions, please call your primary care clinic.  If you do not have a primary care provider, please call [phone] and they will assist you.        Care EveryWhere ID     This is your Care EveryWhere ID. This could be used by other organizations to access your [facility] medical records  [id number]        Your Vitals Were     Pulse Respirations Pulse Oximetry BMI (Body Mass Index)          92 16 97% 23.46 kg/m2         Blood Pressure from Last 3 Encounters:   07/14/17 133/82   05/12/17 135/89   04/25/17 135/86    Weight from Last 3 Encounters:   07/14/17 73.5 kg (162 lb)   05/12/17 73.5 kg (162 lb)   03/31/17 74.8 kg (165 lb)              Today, you had the following     No orders found for display       Primary Care Provider Office Phone # Fax #    [facility] [phone] [address]  [address]  [facility] [address]        Equal Access to Services     RAFAEL RAMIREZ : Hadii aad ku hadcarlosrichie Debbieali, wamaxda luqadaha, qaybta kaalmaaugusto bo. So [facility] [phone].    ATENCIÓN: Si habla español, tiene a beth disposición servicios gratuitos de asistencia lingüística. Abel al [phone].    We comply with applicable federal civil rights laws and [place] laws. We do not discriminate on the basis of race, color, national origin, age, disability sex, sexual orientation or gender identity.            Thank you!     Thank you for choosing [facility]  for your care. Our goal is always to provide you with excellent care. Hearing back from our patients is one way we can continue to improve our services. Please take a few minutes to complete the written survey that you may receive in the mail after your visit with us. Thank you!             Your Updated Medication List - Protect others around you: Learn how to safely use, store and throw away your medicines at www.disposemymeds.org.          This list is accurate as of: 7/14/17  3:25 PM.  Always use your most recent med list.                   Brand Name Dispense Instructions for use Diagnosis    cyclobenzaprine 5 MG tablet    FLEXERIL    60 tablet    Take 1 tablet (5 mg) by mouth 3 times daily as needed for muscle spasms (If causes sedation just take at bedtime.)    Muscle spasm       IBUPROFEN PO      Take 1 tablet by mouth every 8 hours as needed for moderate pain        * lidocaine 5 % ointment    XYLOCAINE    100 g    Apply topically as needed for moderate pain    Allodynia       * lidocaine 5 % Patch    LIDODERM    90 patch    1-3 patches daily as needed. Have patches on for 12 hours and off for 12 hours.    Complex regional pain syndrome type 1 of right upper extremity       MAGNESIUM OXIDE PO           MULTIVITAMIN PO      Take by mouth daily        neomycin-polymyxin-dexamethasone  [id number] Susp ophthalmic susp    MAXITROL     Place 1 drop into both eyes 4 times daily        * order for DME     1 Units    Equipment being ordered: Neoprene or similar type of wrist/arm compression sleeve. Get largest size so it is not too tight.    Complex regional pain syndrome type 2 of right upper extremity       * order for DME     1 Units    Equipment being ordered: TENS    Complex regional pain syndrome type 2 of right upper extremity       pregabalin 50 MG capsule    LYRICA    50 capsule    Take 1 capsule (50 mg) by mouth 2 times daily    Complex regional pain syndrome type 1 of right upper extremity       * Notice:  This list has 4 medication(s) that are the same as other medications prescribed for you. Read the directions carefully, and ask your doctor or other care provider to review them with you.

## 2017-07-14 NOTE — PATIENT INSTRUCTIONS
After Visit Instructions:     Thank you for coming to [facility] for your care. It is my goal to partner with you to help you reach your optimal state of health.     I am recommending multidisciplinary care at this time.  The focus of care will be to continue gradual rehabilitation and pain management with medication adjustments as needed.    Continue daily self-care, identifying contributing factors, and monitoring variations in pain level. Continue to integrate self-care into your life.      1. Schedule physical therapy visits   2. Schedule follow-up with DAYANNA Lazo NP-C in 2 months  3. Procedures recommended: Continue stellate ganglion blocks   Interventional procedures  are done at our [place] and [place] locations.   4. Medication recommendations:   1. Stop Lyrica  2. Look up Protriptyline. If you decide that you want to start this medication, let Lulu know.       DAYANNA Rashid, NP-C  [facility]    Contact information: [facility]    Please call if any side effects, questions, or concerns arise.    Nurse Triage line:  [phone]   Call this number with any questions or concerns. You may leave a detailed message anytime. Calls are typically returned Monday through Friday between 8 AM and 4:30 PM. We usually get back to you within 2 business days depending on the issue/request.    Scheduling number: [phone].  Call this number to schedule or change appointments.          Medication Refills Policy:    For non-narcotic medications, please your pharmacy directly to request a refill and the pharmacy will call the [facility] Center for authorization. Please allow 3-4 days for these refills.    For narcotic refills, call the nurse triage line and leave a message requesting your refill along with the name of the pharmacy that you use. Narcotic prescriptions will be mailed to your pharmacy or you may pick them  up at the clinic.  Please call 7-10 days before your refill is due  The above policy allows adequate time so that you do not run out of medication.    No Show - Late Cancellation - Late Arrival Policy  We believe regular attendance is key to your success in our program.    Any time you are unable to keep your appointment we ask that you call us at  least 24 hours in advance to let us know. This will allow us to offer the appointment time to another patient. The following is our policy for missed appointments. This also applies to appointments cancelled with less than 24 hours notice.    You will receive a letter after missing your 1st and 2nd appointments without contacting the clinic before your scheduled appointment time.     After missing 3 appointments without calling first, we will cancel all of your future appointments at [facility].    At that point, you will not be able to resume services unless approved by your care team  We understand that unforseen circumstances arise, however, out of respect for all concerned and to provide this appointment to another patient, this policy will be enforced.    Please note that most follow up appointments are 30 minutes long. If you arrive late, your provider may not be able to see you for the entire 30 minutes. Please also note that if you arrive more than 15 minutes for any appointment, it may be rescheduled.

## 2017-07-14 NOTE — PROGRESS NOTES
[facility]    CHIEF COMPLAINT: Nerve injury to right arm    INTERVAL HISTORY:  Last seen on 5/12/17.        Recommendations/plan at the last visit included:  1.                  Continue physical therapy and hand therapy visits. Order provided.   2.                  Schedule follow-up with DAYANNA Lazo NP-C in 4 weeks or sooner as needed.   3.                  Procedures recommended: Stellate ganglion block when you are ready.    Interventional procedures  are done at our [place] and [place] locations.   4.                  Order for TENS unit given  5.                  Medication recommendations:                  1. Lyrica 50 mg: take one capsule at bedtime for 1 week, then add another capsule in the morning.     Since his last visit, Denton Hines reports:  - Is using his own insurance since being denied by workers comp. Was sent to specialist PT for nerve injury for shoulder pain.  -Notes too much fatigue, Can't wake up until noon.      Pain Information:   Pain quality: Burning, Numb, Shooting and Stabbing    Pain timing: Worst in morning and in evening     Pain rating: intensity ranges from 4/10 to 10/10, and averages 7/10 on a 0-10 scale.   Aggravating factors include: Using my hand or arm the cold weather   Relieving factors include: Massage, pain medications      Annual Controlled Substance Agreement/UDS due date: N/A    CURRENT RELEVANT PAIN MEDICATIONS:   Ibuprofen: 1-2 tablets per day  Lyrica 50 mg b.i.d. but wants to stop this    Patient is using the medication as prescribed:  N/A  Is your medication helpful? NO   Medication side effects? denies any problems      Previous Pain Relevant Medications: (H--helped; HI--Helped initially; SWH--Somewhat helpful; NH--No help; W--worse; SE--side effects; ?--Unsure if helpful)   NOTE: This medication information taken from patient's intake form, not medical records.   Opiates: Hydrocodone: H, Oxycodone: H  NSAIDS: Ibuprofen: H for  headaches  Muscle Relaxants: none  Anti-migraine mediations: none  Anti-depressants: none  Sleep aids: none  Anti-convulsants: Gabapentin: NH, Sedation   Topicals: none  Other medications not covered above: non      Any illicit drug use: denies  Any use of prescriptions other than how they were prescribed:obeyies  [place] Board of Pharmacy Data Base Reviewed: YES; No concern for abuse or misuse of controlled medications based on this report.       SELF CARE:   How often do you practice SELF-CARE (relaxing, stretching, pacing, monitoring posture, taking mini-breaks) in a typical day: 2-3x per week      Medications:  Current Outpatient Prescriptions   Medication Sig Dispense Refill     pregabalin (LYRICA) 50 MG capsule Take 1 capsule (50 mg) by mouth 2 times daily 50 capsule 1     order for DME Equipment being ordered: TENS 1 Units 0     neomycin-polymyxin-dexamethasone (MAXITROL) 3.5-30773-7.1 SUSP ophthalmic susp Place 1 drop into both eyes 4 times daily       cyclobenzaprine (FLEXERIL) 5 MG tablet Take 1 tablet (5 mg) by mouth 3 times daily as needed for muscle spasms (If causes sedation just take at bedtime.) 60 tablet 3     lidocaine (LIDODERM) 5 % patch 1-3 patches daily as needed. Have patches on for 12 hours and off for 12 hours. 90 patch 0     lidocaine (XYLOCAINE) 5 % ointment Apply topically as needed for moderate pain 100 g 3     IBUPROFEN PO Take 1 tablet by mouth every 8 hours as needed for moderate pain       MAGNESIUM OXIDE PO        order for DME Equipment being ordered: Neoprene or similar type of wrist/arm compression sleeve. Get largest size so it is not too tight. 1 Units 0     Multiple Vitamins-Minerals (MULTIVITAMIN PO) Take by mouth daily         Review of Systems: A 10-point review of systems was negative, with the exception of chronic pain issues.      Social History: Reviewed; unchanged from initial consultation.      Family history: Reviewed; unchanged from initial consultation.      PHYSICAL EXAM    Vitals:    07/14/17 1501   BP: 133/82   BP Location: Left arm   Patient Position: Chair   Cuff Size: Adult Regular   Pulse: 92   Resp: 16   SpO2: 97%   Weight: 73.5 kg (162 lb)       Constitutional: healthy, alert and mild emotional distress, frustrated, somewhat depressed, tearful.  HEENT: Head atraumatic, normocephalic. Eyes without conjunctival injection or jaundice. Neck supple. No obvious neck masses.  Skin: No rash, lesions, or petechiae of exposed skin.   Extremities: Peripheral pulses intact. RUE slightly cooler to the touch and more red in color as compared to LUE. Skin on right hand noticeably more dry and rough and left hand.  Psychiatric/mental status: Alert, without lethargy or stupor. Appropriate affect. Mood normal.   Neurologic exam:  CN: Cranial nerves 2-12 are grossly normal.  Motor:  5/5 LUE and 1/5 hand strength      DIRE Score for ongoing opioid management is calculated as follows:  Diagnosis = 2 pts (slowly progressive; moderate pain/objective findings)  Intractability = 3 pts (patient fully engaged but inadequate response to treatments)  Risk   Psych = 3 pts (no significant personality dysfunction/mental illness; good communication with clinic)   Chem Hlth = 3 pts (no history of chemical dependency; not drug-focused)  Reliability = 3 pts (highly reliable with meds, appointments, treatments)  Social = 3 pts (supportive family/close relationships; involved in work/school; no isolation) Unable to work due to medical condition but otherwise fully engaged.   (Psych + Chem hlth + Reliability + Social) = 17  Efficacy = 2 pts (moderate benefit/function; low med dose; too early/not tried meds)      DIRE Score = 19 Patient is not interested in opioid analgesia.    7-13: likely NOT suitable candidate for long-term opioid analgesia  14-21: may be a suitable candidate for long-term opioid analgesia     [facility] Criteria for diagnosis of Complex Regional Pain Syndrome:    Continuing  pain disproportionate to inciting event: positive for pain  Must report one symptom (subjective) in two of the following four categories:                        Sensory (hyperalgesia or allodynia): positive for hyperalgesia                        Vasomotor (temperature asymmetry, skin color changes, skin color asymmetry): positive for temperature asymmetry, skin color changes, skin color asymmetry                        Pseudomotor/edema (edema, sweating or sweating asymmetry): Negative                        Motor/trophic (decreased range of motion, motor dysfunction, trophic changes in skin, hair or nails): positive for Decreased ROM, motor dysfunction, trophic changes in skin   Must display (objective) one sign in two or more of the following four categories:                        Sensory (hyperalgesia or allodynia): positive for hyperalgesia                        Vasomotor (temperature asymmetry, skin color changes, skin color asymmetry): positive for temperature asymmetry, skin color changes, skin color asymmetry                        Pseudomotor/edema (edema, sweating or sweating asymmetry): negative                        Motor/trophic (decreased range of motion, motor dysfunction, trophic changes in skin, hair or nails): positive for positive for Decreased ROM, motor dysfunction, trophic changes in skin   Patient does meet Budapest Criteria for Complex Regional Pain Syndrome        DIAGNOSTIC TESTS:  Imaging Studies:   No new imaging      Assessment:   1. CRPS of right upper extremity.   2. Right shoulder strain secondary to positioning related to CRPS  3. Situational Depression       Denton presents in the company of an  for follow-up regarding CRPS of the right upper extremity. He is given a copy of the letter that I sent to his 's office regarding his condition.    Reviewed medication. He is not tolerating side effects of Lyrica including extreme fatigue and weight gain. We will have  him discontinue this and I am recommending that he try a tricyclic antidepressant, protriptyline. He will look into it and let me know if he wants to try it. He is quite reluctant to take medication as he has had significant side effects from pretty much everything we've tried. Continue stellate ganglion blocks, physical therapy and follow-up in 2 months.    Plan:    Diagnosis reviewed, treatment option addressed, and risk/benifits discussed.  Self-care instructions given.  I am recommending a multidisciplinary treatment plan to help this patient better manage pain.      1. Schedule physical therapy visits   2. Schedule follow-up with DAYANNA Lazo, NP-C in 2 months  3. Procedures recommended: Continue stellate ganglion blocks   Interventional procedures  are done at our [place] and [place] locations.   4. Medication recommendations:   1. Stop Lyrica  2. Look up Protriptyline. If you decide that you want to start this medication, let Lulu know.     Total time spent face to face was 30 minutes and more than 50% of face to face time was spent in counseling and/or coordination of care regarding the diagnosis and recommendations above.      DAYANNA Rashid, NP-C   [facility]

## 2017-07-28 NOTE — TELEPHONE ENCOUNTER
Late Entry for 7/27/2017 at 4:35 pm     Letter from Dr. Anais Brand completed and faxed over to Jennifer Batres. Copy placed to be scanned into chart.     Closing encounter.     Naye Sethi RN, [facility]  Pain Clinic Care Coordinator

## 2017-09-08 ENCOUNTER — OFFICE VISIT (OUTPATIENT)
Dept: PALLIATIVE MEDICINE | Facility: CLINIC | Age: 27
End: 2017-09-08
Payer: OTHER MISCELLANEOUS

## 2017-09-08 VITALS — HEART RATE: 80 BPM | SYSTOLIC BLOOD PRESSURE: 128 MMHG | RESPIRATION RATE: 16 BRPM | DIASTOLIC BLOOD PRESSURE: 68 MMHG

## 2017-09-08 DIAGNOSIS — G90.511 COMPLEX REGIONAL PAIN SYNDROME TYPE 1 OF RIGHT UPPER EXTREMITY: Primary | ICD-10-CM

## 2017-09-08 PROCEDURE — 99214 OFFICE O/P EST MOD 30 MIN: CPT | Performed by: NURSE PRACTITIONER

## 2017-09-08 ASSESSMENT — PAIN SCALES - GENERAL: PAINLEVEL: EXTREME PAIN (8)

## 2017-09-08 NOTE — PATIENT INSTRUCTIONS
After Visit Instructions:     Thank you for coming to [facility] for your care. It is my goal to partner with you to help you reach your optimal state of health.    Continue daily self-care, identifying contributing factors, and monitoring variations in pain level. Continue to integrate self-care into your life.      1. Continue physical therapy visits  2. Order for TENS unit given.   3. Schedule follow-up with DAYANNA Lazo NP-C as needed  Request functional capacity assessment as part of your settlement. This is needed to determine your percentage of disability for future employment.      Functional Capacity Evaluations available at the following locations.    [facility]:  [phone]   [facility]: [facility] [phone]   [place] [facility]: [phone]   [facility] Therapy: [facility] [phone], [place]: [phone], [place]: [phone]    DAYANNA Rashid NP-C  [facility]    Contact information: [facility]    Please call if any side effects, questions, or concerns arise.    Nurse Triage line:  [phone]   Call this number with any questions or concerns. You may leave a detailed message anytime. Calls are typically returned Monday through Friday between 8 AM and 4:30 PM. We usually get back to you within 2 business days depending on the issue/request.    Scheduling number: [phone].  Call this number to schedule or change appointments.          Medication Refills Policy:    For non-narcotic medications, please your pharmacy directly to request a refill and the pharmacy will call the Pain Management Center for authorization. Please allow 3-4 days for these refills.    For narcotic refills, call the nurse triage line and leave a message requesting your refill along with the name of the pharmacy that you use. Narcotic prescriptions will be mailed to your  pharmacy or you may pick them up at the clinic.  Please call 7-10 days before your refill is due  The above policy allows adequate time so that you do not run out of medication.    No Show - Late Cancellation - Late Arrival Policy  We believe regular attendance is key to your success in our program.    Any time you are unable to keep your appointment we ask that you call us at  least 24 hours in advance to let us know. This will allow us to offer the appointment time to another patient. The following is our policy for missed appointments. This also applies to appointments cancelled with less than 24 hours notice.    You will receive a letter after missing your 1st and 2nd appointments without contacting the clinic before your scheduled appointment time.     After missing 3 appointments without calling first, we will cancel all of your future appointments at [facility].    At that point, you will not be able to resume services unless approved by your care team  We understand that unforseen circumstances arise, however, out of respect for all concerned and to provide this appointment to another patient, this policy will be enforced.    Please note that most follow up appointments are 30 minutes long. If you arrive late, your provider may not be able to see you for the entire 30 minutes. Please also note that if you arrive more than 15 minutes for any appointment, it may be rescheduled.

## 2017-09-08 NOTE — MR AVS SNAPSHOT
After Visit Summary   9/8/2017    Denton Hines    MRN: [id number]           Patient Information     Date Of Birth          1990        Visit Information        Provider Department      9/8/2017 2:15 PM Lulu Fields APRN CNP; [employer] [employer] [facility]        Today's Diagnoses     Complex regional pain syndrome type 1 of right upper extremity    -  1      Care Instructions    After Visit Instructions:     Thank you for coming to [facility] for your care. It is my goal to partner with you to help you reach your optimal state of health.    Continue daily self-care, identifying contributing factors, and monitoring variations in pain level. Continue to integrate self-care into your life.      1. Continue physical therapy visits  2. Order for TENS unit given.   3. Schedule follow-up with DAYANNA Lazo NP-C as needed  Request functional capacity assessment as part of your settlement. This is needed to determine your percentage of disability for future employment.      Functional Capacity Evaluations available at the following locations.    [facility]:  [phone]   [facility]: [facility] [phone]   [place] [facility]: [phone]   [facility] Therapy: [facility] [phone], [place]: [phone], [place]: [phone]    DAYANNA Rashid NP-C  [facility]    Contact information: [facility]    Please call if any side effects, questions, or concerns arise.    Nurse Triage line:  [phone]   Call this number with any questions or concerns. You may leave a detailed message anytime. Calls are typically returned Monday through Friday between 8 AM and 4:30 PM. We usually get back to you within 2 business days depending on the issue/request.    Scheduling number: [phone].  Call this number to schedule or  change appointments.          Medication Refills Policy:    For non-narcotic medications, please your pharmacy directly to request a refill and the pharmacy will call the Pain Management Center for authorization. Please allow 3-4 days for these refills.    For narcotic refills, call the nurse triage line and leave a message requesting your refill along with the name of the pharmacy that you use. Narcotic prescriptions will be mailed to your pharmacy or you may pick them up at the clinic.  Please call 7-10 days before your refill is due  The above policy allows adequate time so that you do not run out of medication.    No Show - Late Cancellation - Late Arrival Policy  We believe regular attendance is key to your success in our program.    Any time you are unable to keep your appointment we ask that you call us at  least 24 hours in advance to let us know. This will allow us to offer the appointment time to another patient. The following is our policy for missed appointments. This also applies to appointments cancelled with less than 24 hours notice.    You will receive a letter after missing your 1st and 2nd appointments without contacting the clinic before your scheduled appointment time.     After missing 3 appointments without calling first, we will cancel all of your future appointments at [facility].    At that point, you will not be able to resume services unless approved by your care team  We understand that unforseen circumstances arise, however, out of respect for all concerned and to provide this appointment to another patient, this policy will be enforced.    Please note that most follow up appointments are 30 minutes long. If you arrive late, your provider may not be able to see you for the entire 30 minutes. Please also note that if you arrive more than 15 minutes for any appointment, it may be rescheduled.                                     Follow-ups after your visit        Your  next 10 appointments already scheduled     Sep 12, 2017 10:30 AM CDT   Radiology Injections with Jayro Brand MD   [facility] [place] ([facility] [place])    [address]  [place] [address] [phone]              Who to contact     If you have questions or need follow up information about today's clinic visit or your schedule please contact [facility] directly at [phone].  Normal or non-critical lab and imaging results will be communicated to you by [employer], letter or phone within 4 business days after the clinic has received the results. If you do not hear from us within 7 days, please contact the clinic through [employer] or phone. If you have a critical or abnormal lab result, we will notify you by phone as soon as possible.  Submit refill requests through [employer] or call your pharmacy and they will forward the refill request to us. Please allow 3 business days for your refill to be completed.          Additional Information About Your Visit        Z[employer]ish Information     [employer] gives you secure access to your electronic health record. If you see a primary care provider, you can also send messages to your care team and make appointments. If you have questions, please call your primary care clinic.  If you do not have a primary care provider, please call [phone] and they will assist you.        Care EveryWhere ID     This is your Care EveryWhere ID. This could be used by other organizations to access your [place] medical records  [id number]        Your Vitals Were     Pulse Respirations                80 16           Blood Pressure from Last 3 Encounters:   09/08/17 128/68   07/14/17 133/82   05/12/17 135/89    Weight from Last 3 Encounters:   07/14/17 73.5 kg (162 lb)   05/12/17 73.5 kg (162 lb)   03/31/17 74.8 kg (165 lb)              Today, you had the following     No orders found for display         Today's Medication Changes           These changes are accurate as of: 9/8/17  2:58 PM.  If you have any questions, ask your nurse or doctor.               These medicines have changed or have updated prescriptions.        Dose/Directions    * order for DME   This may have changed:  Another medication with the same name was added. Make sure you understand how and when to take each.   Used for:  Complex regional pain syndrome type 2 of right upper extremity   Changed by:  Lulu Fields APRN CNP        Equipment being ordered: Neoprene or similar type of wrist/arm compression sleeve. Get largest size so it is not too tight.   Quantity:  1 Units   Refills:  0       * order for DME   This may have changed:  Another medication with the same name was added. Make sure you understand how and when to take each.   Used for:  Complex regional pain syndrome type 2 of right upper extremity   Changed by:  Lulu Fields APRN CNP        Equipment being ordered: TENS   Quantity:  1 Units   Refills:  0       * order for DME   This may have changed:  You were already taking a medication with the same name, and this prescription was added. Make sure you understand how and when to take each.   Used for:  Complex regional pain syndrome type 1 of right upper extremity   Changed by:  Lulu Fields APRN CNP        TENS unit   Quantity:  1 Units   Refills:  0       * Notice:  This list has 3 medication(s) that are the same as other medications prescribed for you. Read the directions carefully, and ask your doctor or other care provider to review them with you.         Where to get your medicines      Some of these will need a paper prescription and others can be bought over the counter.  Ask your nurse if you have questions.     Bring a paper prescription for each of these medications     order for DME                Primary Care Provider Office Phone # Fax #    [facility] [phone] [address]  [address]  [facility] [address]        Equal Access to Services     RAFAEL RAMIREZ : Hadii aad ku hadcarlosrichie Debbieali, wamaxda luqadaha, qaybta kaalmaaugusto bo. So [facility] [phone].    ATENCIÓN: Si habla español, tiene a beth disposición servicios gratuitos de asistencia lingüística. Abel al [phone].    We comply with applicable federal civil rights laws and [place] laws. We do not discriminate on the basis of race, color, national origin, age, disability sex, sexual orientation or gender identity.            Thank you!     Thank you for choosing [facility]  for your care. Our goal is always to provide you with excellent care. Hearing back from our patients is one way we can continue to improve our services. Please take a few minutes to complete the written survey that you may receive in the mail after your visit with us. Thank you!             Your Updated Medication List - Protect others around you: Learn how to safely use, store and throw away your medicines at www.disposemymeds.org.          This list is accurate as of: 9/8/17  2:58 PM.  Always use your most recent med list.                   Brand Name Dispense Instructions for use Diagnosis    cyclobenzaprine 5 MG tablet    FLEXERIL    60 tablet    Take 1 tablet (5 mg) by mouth 3 times daily as needed for muscle spasms (If causes sedation just take at bedtime.)    Muscle spasm       IBUPROFEN PO      Take 1 tablet by mouth every 8 hours as needed for moderate pain        * lidocaine 5 % ointment    XYLOCAINE    100 g    Apply topically as needed for moderate pain    Allodynia       * lidocaine 5 % Patch    LIDODERM    90 patch    1-3 patches daily as needed. Have patches on for 12 hours and off for 12 hours.    Complex regional pain syndrome type 1 of right upper extremity       MAGNESIUM OXIDE PO           MULTIVITAMIN PO      Take by mouth daily        neomycin-polymyxin-dexamethasone  3.5-68225-5.1 Susp ophthalmic susp    MAXITROL     Place 1 drop into both eyes 4 times daily        * order for DME     1 Units    Equipment being ordered: Neoprene or similar type of wrist/arm compression sleeve. Get largest size so it is not too tight.    Complex regional pain syndrome type 2 of right upper extremity       * order for DME     1 Units    Equipment being ordered: TENS    Complex regional pain syndrome type 2 of right upper extremity       * order for DME     1 Units    TENS unit    Complex regional pain syndrome type 1 of right upper extremity       pregabalin 50 MG capsule    LYRICA    50 capsule    Take 1 capsule (50 mg) by mouth 2 times daily    Complex regional pain syndrome type 1 of right upper extremity       * Notice:  This list has 5 medication(s) that are the same as other medications prescribed for you. Read the directions carefully, and ask your doctor or other care provider to review them with you.

## 2017-09-08 NOTE — PROGRESS NOTES
"[facility]    CHIEF COMPLAINT: pain in RUE    INTERVAL HISTORY:  Last seen on 7/14/17.        Recommendations/plan at the last visit included:     1. Schedule physical therapy visits   2. Schedule follow-up with DAYANNA Lazo NP-C in 2 months  3. Procedures recommended: Continue stellate ganglion blocks   Interventional procedures  are done at our [place] and [place] locations.   4. Medication recommendations:                  1. Stop Lyrica  2. Look up Protriptyline. If you decide that you want to start this medication, let Lulu know.     Since his last visit, Denton Hines reports:  - Dose not want to do any more injections or medications. Block injection shelp hand temporarily but seem to make shoulder pain much worse. Will be settling court case. He has been continuing with PT at [facility]. This has been helping somewhat. Doing HEP program.     Pain Information:   Pain quality: Aching, Nagging and Tender    Pain timing: Constant     Pain rating: intensity ranges from 3/10 to 10/10, and averages 7/10 on a 0-10 scale.   Aggravating factors include: \"Using hand\"   Relieving factors include: \"Massage, medication, physical therapy\"      Annual Controlled Substance Agreement/UDS due date: N/A     CURRENT RELEVANT PAIN MEDICATIONS:   Ibuprofen: 1-2 tablets per day  Lyrica 50 mg b.i.d. but wants to stop this     Patient is using the medication as prescribed:  N/A  Is your medication helpful?               NO   Medication side effects? denies any problems        Previous Pain Relevant Medications: (H--helped; HI--Helped initially; SWH--Somewhat helpful; NH--No help; W--worse; SE--side effects; ?--Unsure if helpful)   NOTE: This medication information taken from patient's intake form, not medical records.   Opiates: Hydrocodone: H, Oxycodone: H  NSAIDS: Ibuprofen: H for headaches  Muscle Relaxants: none  Anti-migraine mediations: none  Anti-depressants: none  Sleep aids: none  Anti-convulsants: " Gabapentin: NH, Sedation   Topicals: none  Other medications not covered above: non      Any illicit drug use: denies  Any use of prescriptions other than how they were prescribed:denies  [place] Board of Pharmacy Data Base Reviewed: YES; No concern for abuse or misuse of controlled medications based on this report.       SELF CARE:   How often do you practice SELF-CARE (relaxing, stretching, pacing, monitoring posture, taking mini-breaks) in a typical day: Several times per day     Medications:  Current Outpatient Prescriptions   Medication Sig Dispense Refill     order for DME Equipment being ordered: TENS 1 Units 0     neomycin-polymyxin-dexamethasone (MAXITROL) 3.5-68338-9.1 SUSP ophthalmic susp Place 1 drop into both eyes 4 times daily       IBUPROFEN PO Take 1 tablet by mouth every 8 hours as needed for moderate pain       MAGNESIUM OXIDE PO        order for DME Equipment being ordered: Neoprene or similar type of wrist/arm compression sleeve. Get largest size so it is not too tight. 1 Units 0     Multiple Vitamins-Minerals (MULTIVITAMIN PO) Take by mouth daily       pregabalin (LYRICA) 50 MG capsule Take 1 capsule (50 mg) by mouth 2 times daily (Patient not taking: Reported on 9/8/2017) 50 capsule 1     cyclobenzaprine (FLEXERIL) 5 MG tablet Take 1 tablet (5 mg) by mouth 3 times daily as needed for muscle spasms (If causes sedation just take at bedtime.) (Patient not taking: Reported on 9/8/2017) 60 tablet 3     lidocaine (LIDODERM) 5 % patch 1-3 patches daily as needed. Have patches on for 12 hours and off for 12 hours. (Patient not taking: Reported on 9/8/2017) 90 patch 0     lidocaine (XYLOCAINE) 5 % ointment Apply topically as needed for moderate pain (Patient not taking: Reported on 9/8/2017) 100 g 3       Review of Systems: A 10-point review of systems was negative, with the exception of chronic pain issues.      Social History: Reviewed; unchanged from initial consultation.      Family history:  Reviewed; unchanged from initial consultation.     PHYSICAL EXAM    Vitals:    09/08/17 1421   BP: 128/68   BP Location: Right arm   Patient Position: Chair   Cuff Size: Adult Regular   Pulse: 80   Resp: 16       Constitutional: healthy, alert and mild emotional distress, frustrated, somewhat depressed, tearful.  HEENT: Head atraumatic, normocephalic. Eyes without conjunctival injection or jaundice. Neck supple. No obvious neck masses.  Skin: No rash, lesions, or petechiae of exposed skin.   Extremities: Peripheral pulses intact. RUE slightly cooler to the touch and more red in color as compared to LUE. Skin on right hand noticeably more dry and rough and left hand.  Psychiatric/mental status: Alert, without lethargy or stupor. Appropriate affect. Mood normal.   Neurologic exam:  CN: Cranial nerves 2-12 are grossly normal.  Motor:  5/5 LUE and 1/5 hand strength      DIRE Score for ongoing opioid management is calculated as follows:  Diagnosis = 2 pts (slowly progressive; moderate pain/objective findings)  Intractability = 3 pts (patient fully engaged but inadequate response to treatments)  Risk   Psych = 3 pts (no significant personality dysfunction/mental illness; good communication with clinic)   Chem Hlth = 3 pts (no history of chemical dependency; not drug-focused)  Reliability = 3 pts (highly reliable with meds, appointments, treatments)  Social = 3 pts (supportive family/close relationships; involved in work/school; no isolation) Unable to work due to medical condition but otherwise fully engaged.   (Psych + Chem hlth + Reliability + Social) = 17  Efficacy = 2 pts (moderate benefit/function; low med dose; too early/not tried meds)      DIRE Score = 19 Patient is not interested in opioid analgesia.    7-13: likely NOT suitable candidate for long-term opioid analgesia  14-21: may be a suitable candidate for long-term opioid analgesia      [facility] Criteria for diagnosis of Complex Regional Pain Syndrome:     Continuing pain disproportionate to inciting event: positive for pain  Must report one symptom (subjective) in two of the following four categories:                        Sensory (hyperalgesia or allodynia): positive for hyperalgesia                        Vasomotor (temperature asymmetry, skin color changes, skin color asymmetry): positive for temperature asymmetry, skin color changes, skin color asymmetry                        Pseudomotor/edema (edema, sweating or sweating asymmetry): Negative                        Motor/trophic (decreased range of motion, motor dysfunction, trophic changes in skin, hair or nails): positive for Decreased ROM, motor dysfunction, trophic changes in skin   Must display (objective) one sign in two or more of the following four categories:                        Sensory (hyperalgesia or allodynia): positive for hyperalgesia                        Vasomotor (temperature asymmetry, skin color changes, skin color asymmetry): positive for temperature asymmetry, skin color changes, skin color asymmetry                        Pseudomotor/edema (edema, sweating or sweating asymmetry): negative                        Motor/trophic (decreased range of motion, motor dysfunction, trophic changes in skin, hair or nails): positive for positive for Decreased ROM, motor dysfunction, trophic changes in skin   Patient does meet [place] Criteria for Complex Regional Pain Syndrome          DIAGNOSTIC TESTS:  Imaging Studies:   No new imaging      Assessment:   1. CRPS of right upper extremity.   2. Right shoulder strain secondary to positioning related to CRPS  3. Situational Depression    Denton presents to discuss chronic pain secondary to CRPS affecting his right upper extremity. As noted above he no longer wishes to receive medical treatment do to his upcoming work comp settlement. They will pay for previous treatment will not pay for future treatment. I discussed with him that I would like him  to have a functional capacity assessment and see if they can work this into his settlement as it is quite expensive and it will determine his ability to work in the future. Well I think that continuing physical therapy will be very beneficial to him, he has not received long lasting benefit from stellate ganglion blocks and has had untenable side effects from medications.    He requested order for TENS unit which has been helpful in therapy and I have provided that. I will follow-up with him as needed. No specific recall ordered at this time.      Plan:    Diagnosis reviewed, treatment option addressed, and risk/benifits discussed.  Self-care instructions given.  I am recommending a multidisciplinary treatment plan to help this patient better manage pain.      1. Continue physical therapy visits  2. Order for TENS unit given.   3. Schedule follow-up with DAYANNA Lazo, NP-C as needed  Request functional capacity assessment as part of your settlement. This is needed to determine your percentage of disability for future employment.      Functional Capacity Evaluations available at the following locations.    [facility]:  [phone]   [facility]: [facility] [phone]   [facility] [facility]: [phone]   [facility]: [facility] [phone], [place]: [phone], [place]: [phone]    Total time spent face to face was 30 minutes and more than 50% of face to face time was spent in counseling and/or coordination of care regarding the diagnosis and recommendations above.      DAYANNA Rashid, NP-C   [facility]

## 2017-09-08 NOTE — NURSING NOTE
"Chief Complaint   Patient presents with     Pain       Initial /68 (BP Location: Right arm, Patient Position: Chair, Cuff Size: Adult Regular)  Pulse 80  Resp 16 Estimated body mass index is 23.46 kg/(m^2) as calculated from the following:    Height as of 7/5/16: 1.77 m (5' 9.69\").    Weight as of 7/14/17: 73.5 kg (162 lb).  Medication Reconciliation: complete         Guru Trevizo MA  [facility]        "

## 2017-09-11 ENCOUNTER — TELEPHONE (OUTPATIENT)
Dept: PALLIATIVE MEDICINE | Facility: CLINIC | Age: 27
End: 2017-09-11

## 2017-09-11 NOTE — TELEPHONE ENCOUNTER
[facility] Called.   Patient is requesting tens unit.  Requesting we fax or e-scribe medication.     . [phone]  Fax: [phone]

## 2017-09-12 NOTE — TELEPHONE ENCOUNTER
Routing to Lulu Fields for an order for TENS unit if appropriate. Will fax to number below.    SHANIA SwainN, RN  Care Coordinator  [facility]

## 2017-09-12 NOTE — TELEPHONE ENCOUNTER
Faxed to the number below.    SHANIA SwainN, RN  Care Coordinator  [facility]

## 2019-05-21 ENCOUNTER — HOSPITAL ENCOUNTER (EMERGENCY)
Facility: CLINIC | Age: 29
Discharge: HOME | End: 2019-05-21
Attending: NURSE PRACTITIONER | Admitting: NURSE PRACTITIONER
Payer: COMMERCIAL

## 2019-05-21 VITALS
OXYGEN SATURATION: 93 % | RESPIRATION RATE: 18 BRPM | TEMPERATURE: 97.8 F | SYSTOLIC BLOOD PRESSURE: 124 MMHG | DIASTOLIC BLOOD PRESSURE: 85 MMHG | HEART RATE: 89 BPM
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
## 2019-05-21 PROCEDURE — 25000132 ZZH RX MED GY IP 250 OP 250 PS 637: Performed by: NURSE PRACTITIONER

## 2019-05-21 PROCEDURE — 99283 EMERGENCY DEPT VISIT LOW MDM: CPT

## 2019-05-21 RX ORDER — DOXYCYCLINE 100 MG/1
200 CAPSULE ORAL ONCE
Status: COMPLETED | OUTPATIENT
Start: 2019-05-21 | End: 2019-05-21

## 2019-05-21 RX ADMIN — DOXYCYCLINE HYCLATE 200 MG: 100 CAPSULE ORAL at 22:47

## 2019-05-21 ASSESSMENT — ENCOUNTER SYMPTOMS
FEVER: 0
WOUND: 1
PHOTOPHOBIA: 0
WEAKNESS: 0
NAUSEA: 0
HEADACHES: 0
ARTHRALGIAS: 0
LIGHT-HEADEDNESS: 0
MYALGIAS: 0
BRUISES/BLEEDS EASILY: 0
SHORTNESS OF BREATH: 0
PALPITATIONS: 0
ABDOMINAL PAIN: 0
DIZZINESS: 0
NECK PAIN: 0

## 2019-05-21 NOTE — ED AVS SNAPSHOT
Emergency Department  [address] [place] [address]  Phone:  [phone]  Fax:  [phone]                                    Denton Hines   MRN: [id number]    Department:   Emergency Department   Date of Visit:  5/21/2019           After Visit Summary Signature Page    I have received my discharge instructions, and my questions have been answered. I have discussed any challenges I see with this plan with the nurse or doctor.    ..........................................................................................................................................  Patient/Patient Representative Signature      ..........................................................................................................................................  Patient Representative Print Name and Relationship to Patient    ..................................................               ................................................  Date                                   Time    ..........................................................................................................................................  Reviewed by Signature/Title    ...................................................              ..............................................  Date                                               Time          22EPIC Rev 08/18

## 2019-05-22 NOTE — ED PROVIDER NOTES
"  History     Chief Complaint:  Insect Bite (Removed tick from lower right leg. Told by brother to go to the pharmacy who told him to go to the ER because \"some of them are sick\". )     HPI   Denton Hines is a 28 year old male who presents by himself for evaluation of tick bite. Patient notes he was outside several days over the weekend and today had a tick crawling on him from his coat pocket and then also found a tick attached to his right posterior lower leg. He removed the tick from his lower leg and was seen at a pharmacy who recommended the patient be evaluated in the ER. The patient notes he does not know when he got the tick as it could've been anytime in the last 3 days as [place] the [place]. He also notes he had a upper respiratory infection with cough the last 3 days. He denies fever, redness around the site, arthralgia, myalgia. The patient is to start medication for allergies tomorrow.  Last tetanus 6/23/15.    Allergies:  No Known Allergies     Medications:      cyclobenzaprine (FLEXERIL) 5 MG tablet   IBUPROFEN PO   lidocaine (LIDODERM) 5 % patch   lidocaine (XYLOCAINE) 5 % ointment   MAGNESIUM OXIDE PO   Multiple Vitamins-Minerals (MULTIVITAMIN PO)   neomycin-polymyxin-dexamethasone (MAXITROL) 3.5-74899-0.1 SUSP ophthalmic susp   order for DME   order for DME   order for DME   pregabalin (LYRICA) 50 MG capsule     Past Medical History:    Past Medical History:   Diagnosis Date     Epilepsy (H)      Nerve injury      Seizures (H)      Patient Active Problem List    Diagnosis Date Noted     Complex regional pain syndrome type 2 of right upper extremity 10/27/2016     Priority: Medium     Shoulder pain 11/12/2015     Priority: Medium     Muscle pain 11/12/2015     Priority: Medium      Past Surgical History:    Past Surgical History:   Procedure Laterality Date     RELEASE CARPAL TUNNEL Right 10/22/2015    Procedure: RELEASE CARPAL TUNNEL;  Surgeon: Ana Ruvalcaba MD;  " [facility]     REPAIR NERVE MICROSCOPIC UPPER EXTREMITY Right 10/22/2015    Procedure: REPAIR NERVE MICROSCOPIC UPPER EXTREMITY;  Surgeon: Ana Ruvalcaba MD;  Location: [facility]     SINUS SURGERY        Family History:    family history is not on file.    Social History:   reports that he has never smoked. He has never used smokeless tobacco. He reports that he does not drink alcohol or use drugs.    PCP: Shemar Campbell     Review of Systems   Constitutional: Negative for fever.   Eyes: Negative for photophobia.   Respiratory: Negative for shortness of breath.    Cardiovascular: Negative for chest pain, palpitations and leg swelling.   Gastrointestinal: Negative for abdominal pain and nausea.   Musculoskeletal: Negative for arthralgias, myalgias and neck pain.   Skin: Positive for wound. Negative for rash.   Neurological: Negative for dizziness, weakness, light-headedness and headaches.   Hematological: Does not bruise/bleed easily.     Physical Exam     Patient Vitals for the past 24 hrs:   BP Temp Temp src Pulse Heart Rate Resp SpO2   05/21/19 2254 124/85 -- -- 89 -- -- 93 %   05/21/19 2205 155/85 97.8  F (36.6  C) Oral -- 92 18 96 %      Physical Exam  Nursing notes reviewed. Vitals reviewed.  General: Alert. Well kept.  Eyes:  Conjunctiva non-injected, non-icteric.  Neck/Throat: Moist mucous membranes. Normal voice.  Cardiac: Regular rhythm. Normal heart sounds.  Pulmonary: Clear and equal breath sounds bilaterally.   Musculoskeletal: Normal gross range of motion of all 4 extremities.   Neurological: Alert and oriented x4.   Skin: Warm and dry. 3mm area on posterior right lower leg with erythema and central punctate.  Psych: Affect normal. Good eye contact.    Emergency Department Course   Interventions:  Medications   doxycycline hyclate (VIBRAMYCIN) capsule 200 mg (200 mg Oral Given 5/21/19 9135)      Emergency Department Course:  Past medical records, nursing notes, and vitals reviewed.  I  performed an exam of the patient and obtained history, as documented above.  Findings and plan explained to the Patient. Patient was discharged home.    Impression & Plan    Medical Decision Making:  Denton Hines is a 28 year old male who presents following a tick bite. On physical exam, the patient has an area of erythema and punctate to right lower extremity area consistent with response following a tick bite.  At this time, there is no sign of secondary infection or erythema migrans. He never had any anaphylactic type response symptoms including chest tightness, or difficulty breathing/ swallowing. We discussed risk factors for tick borne disease and after discussion of risks versus benefits of watchful waiting versus prophylaxis with doxycycline, due to the fact that the tick may have been present more that 36 hours, he meets the criteria from the IDSA for prophylaxis and patient requests treatment today.  The patient will follow-up with primary care.  Return for any worsening or concerning symptoms     Diagnosis:    ICD-10-CM    1. Tick bite, initial encounter W57.XXXA      Discharge Medications:  none    5/21/2019   Kitty Elma, Kitty Weiner CNP  05/21/19 2314

## 2019-07-10 ENCOUNTER — OFFICE VISIT (OUTPATIENT)
Dept: URGENT CARE | Facility: URGENT CARE | Age: 29
End: 2019-07-10
Payer: COMMERCIAL

## 2019-07-10 VITALS
HEIGHT: 69 IN | TEMPERATURE: 98.8 F | SYSTOLIC BLOOD PRESSURE: 152 MMHG | DIASTOLIC BLOOD PRESSURE: 87 MMHG | RESPIRATION RATE: 14 BRPM | WEIGHT: 174.31 LBS | OXYGEN SATURATION: 98 % | BODY MASS INDEX: 25.82 KG/M2 | HEART RATE: 85 BPM

## 2019-07-10 DIAGNOSIS — R11.0 NAUSEA: ICD-10-CM

## 2019-07-10 DIAGNOSIS — G43.809 OTHER MIGRAINE WITHOUT STATUS MIGRAINOSUS, NOT INTRACTABLE: Primary | ICD-10-CM

## 2019-07-10 PROCEDURE — 99214 OFFICE O/P EST MOD 30 MIN: CPT | Performed by: FAMILY MEDICINE

## 2019-07-10 RX ORDER — SUMATRIPTAN 50 MG/1
50 TABLET, FILM COATED ORAL
Qty: 8 TABLET | Refills: 1 | Status: SHIPPED | OUTPATIENT
Start: 2019-07-10 | End: 2019-08-09

## 2019-07-10 ASSESSMENT — MIFFLIN-ST. JEOR: SCORE: 1738.12

## 2019-07-29 NOTE — PROGRESS NOTES
"SUBJECTIVE: Denton Hines is a 28 year old male presenting with a chief complaint of ha and nausea.  Onset of symptoms was  day(s) ago.  Course of illness is same.    Severity moderate  Current and Associated symptoms: runny nose and stuffy nose  Treatment measures tried include Tylenol/Ibuprofen.  Predisposing factors include None.    Past Medical History:   Diagnosis Date     Epilepsy (H)      Nerve injury     right motor nerve injury     Seizures (H)     9-30-15 off medications, no episodes for 6 months     Allergies   Allergen Reactions     Lamotrigine      Other reaction(s): Unknown     Seasonal Allergies      Social History     Tobacco Use     Smoking status: Never Smoker     Smokeless tobacco: Never Used   Substance Use Topics     Alcohol use: No       ROS:  SKIN: no rash  GI: no vomiting    OBJECTIVE:  /87   Pulse 85   Temp 98.8  F (37.1  C) (Tympanic)   Resp 14   Ht 1.74 m (5' 8.5\")   Wt 79.1 kg (174 lb 5 oz)   SpO2 98%   BMI 26.12 kg/m  GENERAL APPEARANCE: healthy, alert and no distress  EYES: EOMI,  PERRL, conjunctiva clear  HENT: ear canals and TM's normal.  Nose and mouth without ulcers, erythema or lesions  NECK: supple, nontender, no lymphadenopathy  RESP: lungs clear to auscultation - no rales, rhonchi or wheezes  CV: regular rates and rhythm, normal S1 S2, no murmur noted  ABDOMEN:  soft, nontender, no HSM or masses and bowel sounds normal  NEURO: Normal strength and tone, sensory exam grossly normal,  normal speech and mentation  SKIN: no suspicious lesions or rashes      ICD-10-CM    1. Other migraine without status migrainosus, not intractable G43.809 SUMAtriptan (IMITREX) 50 MG tablet   2. Nausea R11.0 SUMAtriptan (IMITREX) 50 MG tablet       Fluids/Rest, f/u if worse/not any better    "

## 2020-03-10 ENCOUNTER — HEALTH MAINTENANCE LETTER (OUTPATIENT)
Age: 30
End: 2020-03-10

## 2020-05-11 ENCOUNTER — HOSPITAL ENCOUNTER (EMERGENCY)
Facility: CLINIC | Age: 30
Discharge: HOME | End: 2020-05-11
Attending: EMERGENCY MEDICINE | Admitting: EMERGENCY MEDICINE
Payer: COMMERCIAL

## 2020-05-11 ENCOUNTER — NURSE TRIAGE (OUTPATIENT)
Dept: NURSING | Facility: CLINIC | Age: 30
End: 2020-05-11

## 2020-05-11 VITALS
HEART RATE: 88 BPM | WEIGHT: 177 LBS | TEMPERATURE: 98.5 F | BODY MASS INDEX: 26.22 KG/M2 | SYSTOLIC BLOOD PRESSURE: 129 MMHG | DIASTOLIC BLOOD PRESSURE: 66 MMHG | OXYGEN SATURATION: 97 % | RESPIRATION RATE: 20 BRPM | HEIGHT: 69 IN

## 2020-05-11 DIAGNOSIS — K29.00 OTHER ACUTE GASTRITIS WITHOUT HEMORRHAGE: ICD-10-CM

## 2020-05-11 DIAGNOSIS — R19.7 VOMITING AND DIARRHEA: ICD-10-CM

## 2020-05-11 DIAGNOSIS — R11.10 VOMITING AND DIARRHEA: ICD-10-CM

## 2020-05-11 DIAGNOSIS — E87.6 HYPOKALEMIA: ICD-10-CM

## 2020-05-11 LAB
ALBUMIN SERPL-MCNC: 4.2 G/DL (ref 3.4–5)
ALP SERPL-CCNC: 56 U/L (ref 40–150)
ALT SERPL W P-5'-P-CCNC: 35 U/L (ref 0–70)
ANION GAP SERPL CALCULATED.3IONS-SCNC: 10 MMOL/L (ref 3–14)
AST SERPL W P-5'-P-CCNC: 21 U/L (ref 0–45)
BASOPHILS # BLD AUTO: 0 10E9/L (ref 0–0.2)
BASOPHILS NFR BLD AUTO: 0.1 %
BILIRUB SERPL-MCNC: 1.1 MG/DL (ref 0.2–1.3)
BUN SERPL-MCNC: 19 MG/DL (ref 7–30)
CALCIUM SERPL-MCNC: 9.2 MG/DL (ref 8.5–10.1)
CHLORIDE SERPL-SCNC: 106 MMOL/L (ref 94–109)
CO2 SERPL-SCNC: 21 MMOL/L (ref 20–32)
CREAT SERPL-MCNC: 1.02 MG/DL (ref 0.66–1.25)
DIFFERENTIAL METHOD BLD: ABNORMAL
EOSINOPHIL # BLD AUTO: 0 10E9/L (ref 0–0.7)
EOSINOPHIL NFR BLD AUTO: 0.3 %
ERYTHROCYTE [DISTWIDTH] IN BLOOD BY AUTOMATED COUNT: 12.7 % (ref 10–15)
GFR SERPL CREATININE-BSD FRML MDRD: >90 ML/MIN/{1.73_M2}
GLUCOSE SERPL-MCNC: 120 MG/DL (ref 70–99)
HCT VFR BLD AUTO: 46.6 % (ref 40–53)
HGB BLD-MCNC: 15.9 G/DL (ref 13.3–17.7)
IMM GRANULOCYTES # BLD: 0 10E9/L (ref 0–0.4)
IMM GRANULOCYTES NFR BLD: 0.3 %
LIPASE SERPL-CCNC: 128 U/L (ref 73–393)
LYMPHOCYTES # BLD AUTO: 0.6 10E9/L (ref 0.8–5.3)
LYMPHOCYTES NFR BLD AUTO: 6.1 %
MCH RBC QN AUTO: 28 PG (ref 26.5–33)
MCHC RBC AUTO-ENTMCNC: 34.1 G/DL (ref 31.5–36.5)
MCV RBC AUTO: 82 FL (ref 78–100)
MONOCYTES # BLD AUTO: 0.7 10E9/L (ref 0–1.3)
MONOCYTES NFR BLD AUTO: 6.4 %
NEUTROPHILS # BLD AUTO: 8.9 10E9/L (ref 1.6–8.3)
NEUTROPHILS NFR BLD AUTO: 86.8 %
NRBC # BLD AUTO: 0 10*3/UL
NRBC BLD AUTO-RTO: 0 /100
PLATELET # BLD AUTO: 212 10E9/L (ref 150–450)
POTASSIUM SERPL-SCNC: 3.3 MMOL/L (ref 3.4–5.3)
PROT SERPL-MCNC: 8.1 G/DL (ref 6.8–8.8)
RBC # BLD AUTO: 5.67 10E12/L (ref 4.4–5.9)
SODIUM SERPL-SCNC: 137 MMOL/L (ref 133–144)
WBC # BLD AUTO: 10.2 10E9/L (ref 4–11)

## 2020-05-11 PROCEDURE — 85025 COMPLETE CBC W/AUTO DIFF WBC: CPT | Performed by: EMERGENCY MEDICINE

## 2020-05-11 PROCEDURE — 25800030 ZZH RX IP 258 OP 636: Performed by: EMERGENCY MEDICINE

## 2020-05-11 PROCEDURE — 96361 HYDRATE IV INFUSION ADD-ON: CPT

## 2020-05-11 PROCEDURE — 25000132 ZZH RX MED GY IP 250 OP 250 PS 637: Performed by: EMERGENCY MEDICINE

## 2020-05-11 PROCEDURE — 25000128 H RX IP 250 OP 636: Performed by: EMERGENCY MEDICINE

## 2020-05-11 PROCEDURE — C9113 INJ PANTOPRAZOLE SODIUM, VIA: HCPCS | Performed by: EMERGENCY MEDICINE

## 2020-05-11 PROCEDURE — 83690 ASSAY OF LIPASE: CPT | Performed by: EMERGENCY MEDICINE

## 2020-05-11 PROCEDURE — 96374 THER/PROPH/DIAG INJ IV PUSH: CPT

## 2020-05-11 PROCEDURE — 80053 COMPREHEN METABOLIC PANEL: CPT | Performed by: EMERGENCY MEDICINE

## 2020-05-11 PROCEDURE — 99284 EMERGENCY DEPT VISIT MOD MDM: CPT | Mod: 25

## 2020-05-11 RX ORDER — ONDANSETRON 4 MG/1
4 TABLET, ORALLY DISINTEGRATING ORAL EVERY 8 HOURS PRN
Qty: 10 TABLET | Refills: 0 | Status: SHIPPED | OUTPATIENT
Start: 2020-05-11 | End: 2020-05-14

## 2020-05-11 RX ORDER — DICYCLOMINE HCL 20 MG
20 TABLET ORAL
Status: DISCONTINUED | OUTPATIENT
Start: 2020-05-11 | End: 2020-05-11 | Stop reason: HOSPADM

## 2020-05-11 RX ADMIN — DICYCLOMINE HYDROCHLORIDE 20 MG: 20 TABLET ORAL at 06:23

## 2020-05-11 RX ADMIN — SODIUM CHLORIDE 1000 ML: 9 INJECTION, SOLUTION INTRAVENOUS at 06:26

## 2020-05-11 RX ADMIN — SODIUM CHLORIDE 1000 ML: 9 INJECTION, SOLUTION INTRAVENOUS at 05:36

## 2020-05-11 RX ADMIN — PANTOPRAZOLE SODIUM 40 MG: 40 INJECTION, POWDER, FOR SOLUTION INTRAVENOUS at 06:26

## 2020-05-11 ASSESSMENT — ENCOUNTER SYMPTOMS
FEVER: 0
NAUSEA: 1
DIARRHEA: 1
CHILLS: 1
VOMITING: 1
ABDOMINAL PAIN: 1
COUGH: 0

## 2020-05-11 ASSESSMENT — MIFFLIN-ST. JEOR: SCORE: 1753.25

## 2020-05-11 NOTE — TELEPHONE ENCOUNTER
Starting at 10pm, the patient began having diarrhea and feeling cold, chills   At 10pm his temp was 97.9.   Kept feeling cold no matter what he does  Took temp again and it is 99.6    Patient also has a Headache, but has history of migraines and seasonal allergies and thinks headache is due to this    Nauseated but has not vomited     Has had diarrhea 10x over last 6 hours  Very watery    5/10 abdominal pain in the lower abdomen    When stands up he feels very dizzy  Unsure when he last urinated.     No cough  No shortness of breath  No known exposure to covid19  No recent travel     Triaged to a disposition of Go to ED Now due to dizziness. Patient would like to wait until morning to see if he feels better. Offered [email or web address] as a solution. Patient thinks this may be the best option. If he begins to feel worse he will go to ED.     COVID 19 Nurse Triage Plan/Patient Instructions    Please be aware that novel coronavirus (COVID-19) may be circulating in the community. If you develop symptoms such as fever, cough, or SOB or if you have concerns about the presence of another infection including coronavirus (COVID-19), please contact your health care provider or visit [email or web address].     Disposition/Instructions    Patient to have an [facility] Visit with a provider (Preferred option). Follow System Ambulatory Workflow for COVID 19.     To do this follow these instructions:    1. Go to the website [email or web address]  2. Create an account (you will need your insurance information)  3. Start a new visit  4. Choose your diagnosis (e.g. COVID19)  5. Fill out the information about your symptoms  6. A provider will reach out to you by text, phone call or video visit based on your request    Call Back If: Your symptoms worsen before you are able to complete your [facility] Visit with a provider.  Patient to go to ED and follow protocol based instructions. Follow System Ambulatory Workflow for COVID 19.     Bring Your Own  Device:  Please also bring your smart device(s) (smart phones, tablets, laptops) and their charging cables for your personal use and to communicate with your care team during your visit.    Thank you for limiting contact with others, wearing a simple mask to cover your cough, practice good hand hygiene habits and accessing our virtual services where possible to limit the spread of this virus.    For more information about COVID19 and options for caring for yourself at home, please visit the CDC website at https://www.cdc.gov/coronavirus/2019-ncov/about/steps-when-sick.html  For more options for care at [facility], please visit our website at [email or web address]    For more information, please use the [place] Department of Health COVID-19 Website: [email or web address]/[email or web address]/[email or web address].html  [place] Department [place] Health ([facility]) COVID-19 Hotlines (Interpreters available):      Health questions: Phone Number: [phone] or [phone] and Hours: 7 a.m. to 7 p.m.    Schools and  questions: Phone Number: [phone] or [phone] and Hours 7 a.m. to 7 p.m.    Reason for Disposition    [1] Drinking very little AND [2] dehydration suspected (e.g., no urine > 12 hours, very dry mouth, very lightheaded)    Additional Information    Negative: Shock suspected (e.g., cold/pale/clammy skin, too weak to stand, low BP, rapid pulse)    Negative: Difficult to awaken or acting confused (e.g., disoriented, slurred speech)    Negative: Sounds like a life-threatening emergency to the triager    Negative: Vomiting also present and worse than the diarrhea    Negative: [1] Blood in stool AND [2] without diarrhea    Negative: Diarrhea in a cancer patient who is currently (or recently) receiving chemotherapy or radiation therapy, or cancer patient who has metastatic or end-stage cancer and is receiving palliative care    Negative: [1] SEVERE abdominal pain (e.g.,  excruciating) AND [2] present > 1 hour    Negative: [1] SEVERE abdominal pain AND [2] age > 60    Negative: [1] Blood in the stool AND [2] moderate or large amount of blood    Negative: Black or tarry bowel movements  (Exception: chronic-unchanged  black-grey bowel movements AND is taking iron pills or Pepto-bismol)    Protocols used: DIARRHEA-A-AH    Raine Faustin RN on 5/11/2020 at 3:31 AM

## 2020-05-11 NOTE — ED TRIAGE NOTES
"Pt started having nausea/vomiting and diarrhea that started last night at 2200. Vomited x1 and had 10 diarrhea stools. . Pt reports he feels his abd is \"like a balloon\"   "

## 2020-05-11 NOTE — ED NOTES
Bed: ED03  Expected date: 5/11/20  Expected time: 4:15 AM  Means of arrival: Ambulance  Comments:  Wilda 514 29M N/V/D

## 2020-05-11 NOTE — ED AVS SNAPSHOT
Emergency Department  [address] [facility] [address]  Phone:  [phone]  Fax:  [phone]                                    Denton Hines   MRN: [id number]    Department:   Emergency Department   Date of Visit:  5/11/2020           After Visit Summary Signature Page    I have received my discharge instructions, and my questions have been answered. I have discussed any challenges I see with this plan with the nurse or doctor.    ..........................................................................................................................................  Patient/Patient Representative Signature      ..........................................................................................................................................  Patient Representative Print Name and Relationship to Patient    ..................................................               ................................................  Date                                   Time    ..........................................................................................................................................  Reviewed by Signature/Title    ...................................................              ..............................................  Date                                               Time          22EPIC Rev 08/18

## 2020-05-11 NOTE — ED PROVIDER NOTES
History   Chief Complaint:  Nausea, Vomiting, & Diarrhea       HPI   Denton Hines is a 29 year old male who presents with nonbloody nonbilious diffuse watery vomiting and diarrhea.  Patient states he was in [place] with family yesterday and ate [employer] food.  However he does not feel any of the other family members became ill to his knowledge.  Starting at about 10 PM patient began having watery diarrhea following which he had several episodes of vomiting as well.  Patient has crampy abdominal pain but no specific location of abdominal pain.  He feels mild chills but no fever.  Patient states no recent travel other than to [place].  He denies any recent antibiotics or sick contacts.  He denies cough, fever, congestion.  No known exposures to COVID-19.  Patient is feeling better from a nausea standpoint however he continues to have some mild epigastric abdominal pain that came on after the vomiting started.  Patient denies surgical history to his abdomen.    Allergies:  Lamotrigine  Seasonal Allergies    Medications:   The patient denies any regular medication use.    Past Medical History:    Epilepsy  Seizures  Nerve injury  Complex regional pain syndrome type 2 of right upper extremity     Past Surgical History:    Right carpal tunnel release  Sinus surgery  Repair nerve microscopic upper extremity, right    Family History:    No past pertinent family history.    Social History:  The patient presents to the ED accompanied by himself.  Smoking status- never smoker  Alcohol use- no  Drug use- no      Review of Systems   Constitutional: Positive for chills. Negative for fever.   HENT: Negative for congestion.    Respiratory: Negative for cough.    Gastrointestinal: Positive for abdominal pain, diarrhea, nausea and vomiting.   All other systems reviewed and are negative.        Physical Exam     Patient Vitals for the past 24 hrs:   BP Temp Temp src Pulse Heart Rate Resp SpO2 Height Weight   05/11/20 0646 -- --  "-- -- -- -- 97 % -- --   05/11/20 0645 129/66 -- -- 88 -- -- 96 % -- --   05/11/20 0632 -- -- -- -- -- -- 98 % -- --   05/11/20 0631 -- -- -- -- -- -- 98 % -- --   05/11/20 0630 128/69 -- -- 92 -- -- 98 % -- --   05/11/20 0601 -- -- -- -- -- -- 97 % -- --   05/11/20 0600 134/67 -- -- 91 -- -- 97 % -- --   05/11/20 0546 -- -- -- -- -- -- 96 % -- --   05/11/20 0545 138/68 -- -- 91 -- -- 96 % -- --   05/11/20 0525 (!) 144/85 98.5  F (36.9  C) Oral -- 95 20 98 % 1.753 m (5' 9\") 80.3 kg (177 lb)       Physical Exam  General: Patient is alert and normal appearing.  HEENT: Head atraumatic    Eyes: pupils equal and reactive. Conjunctiva clear   Nares: patent   Oropharynx: no lesions, uvula midline, no palatal draping, normal voice, no trismus  Neck: Supple without lymphadenopathy, no meningismus  Chest: Heart regular rate and rhythm.   Lungs: Equal clear to auscultation with no wheeze or rales  Abdomen: Soft, underlies abdominal tenderness to palpation with no guarding or rebound and greatest in the midepigastrium region, nondistended, normal bowel sounds  Back: No costovertebral angle tenderness, no midline C, T or L spine tenderness  Neuro: Grossly nonfocal, normal speech, strength equal bilaterally, CN 2-12 intact  Extremities: No deformities, equal radial and DP pulses. No clubbing, cyanosis.  No edema  Skin: Warm and dry with no rash.       Emergency Department Course   Laboratory:  Laboratory findings were communicated with the patient who voiced understanding of the findings.    CMP: Glucose 120 (H), potassium 3.3 (L) , o/w WNL (Creatinine: 1.02)  CBC: WNL (WBC 10.3, HGB 15.9, )  Lipase: 128    Interventions:  0536 NS Bolus 1L IV  0623 dicyclomine 20mg PO  0626 Pantoprazole 40mg IV     Emergency Department Course:  Past medical records, nursing notes, and vitals reviewed.    0600 I performed an exam of the patient as documented above.     IV was inserted and blood was drawn for laboratory testing, results " above.    0705 I rechecked the patient and discussed the results of his workup thus far.     Findings and plan explained to the Patient. Patient discharged home with instructions regarding supportive care, medications, and reasons to return. The importance of close follow-up was reviewed. The patient was prescribed Zofran 4m and Prilosec     I personally reviewed the laboratory results with the Patient and answered all related questions prior to discharge.    Impression & Plan      Medical Decision Making:  Denton Hines is a 29 year old male who presents for evaluation of nausea, vomiting and diarrhea with mild abdominal pain in a nonfocal abdominal exam.  There are no ill contacts.  I considered a broad differential diagnosis for this patient including viral gastroenteritis, bacterial infection of the large intestine (salmonella, shigella, campylobacter, e coli, etc), bowel obstruction, intra-abdominal infection such as colitis, food poisoning, cholecystitis, UTI, pyelonephritis, appendicitis, etc.  There are no signs of worrisome intra-abdominal pathologies detected during the visit today.  He has a completely benign abdominal exam without rebound, guarding, or marked tenderness to palpation.  Supportive outpatient management is therefore indicated.  Abdominal pain precautions are given for home.   No indication for stool studies at this time.  No indication for CT at this time.  He passed oral challenge here in ED. It was discussed to return to the ED for blood in stool, increasing pain, or fevers more than 102.   Feels much improved after interventions in ED.         Diagnosis:    ICD-10-CM    1. Hypokalemia  E87.6    2. Vomiting and diarrhea  R11.10     R19.7    3. Other acute gastritis without hemorrhage  K29.00         Disposition:  Discharged to home.    Discharge Medications:  New Prescriptions    OMEPRAZOLE (PRILOSEC) 20 MG DR CAPSULE    Take 1 capsule (20 mg) by mouth daily    ONDANSETRON (ZOFRAN ODT)  4 MG ODT TAB    Take 1 tablet (4 mg) by mouth every 8 hours as needed         5/11/2020   Jackson Foster  I, Jackson Foster, am serving as a scribe at 6:02 AM on 5/11/2020 to document services personally performed by Hillary Christy MD based on my observations and the provider's statements to me.      Hillary Christy MD  05/11/20 0834

## 2020-12-27 ENCOUNTER — HEALTH MAINTENANCE LETTER (OUTPATIENT)
Age: 30
End: 2020-12-27

## 2021-01-14 ENCOUNTER — APPOINTMENT (OUTPATIENT)
Dept: GENERAL RADIOLOGY | Facility: CLINIC | Age: 31
End: 2021-01-14
Attending: EMERGENCY MEDICINE
Payer: COMMERCIAL

## 2021-01-14 ENCOUNTER — TRANSFERRED RECORDS (OUTPATIENT)
Dept: HEALTH INFORMATION MANAGEMENT | Facility: CLINIC | Age: 31
End: 2021-01-14

## 2021-01-14 ENCOUNTER — HOSPITAL ENCOUNTER (EMERGENCY)
Facility: CLINIC | Age: 31
Discharge: HOME | End: 2021-01-14
Attending: EMERGENCY MEDICINE | Admitting: EMERGENCY MEDICINE
Payer: COMMERCIAL

## 2021-01-14 VITALS
SYSTOLIC BLOOD PRESSURE: 114 MMHG | DIASTOLIC BLOOD PRESSURE: 72 MMHG | OXYGEN SATURATION: 96 % | WEIGHT: 182 LBS | HEART RATE: 71 BPM | TEMPERATURE: 97.6 F | HEIGHT: 69 IN | RESPIRATION RATE: 9 BRPM | BODY MASS INDEX: 26.96 KG/M2

## 2021-01-14 DIAGNOSIS — R07.9 CHEST PAIN, UNSPECIFIED TYPE: ICD-10-CM

## 2021-01-14 LAB
ANION GAP SERPL CALCULATED.3IONS-SCNC: 5 MMOL/L (ref 3–14)
BASOPHILS # BLD AUTO: 0 10E9/L (ref 0–0.2)
BASOPHILS NFR BLD AUTO: 0.4 %
BUN SERPL-MCNC: 17 MG/DL (ref 7–30)
CALCIUM SERPL-MCNC: 8.7 MG/DL (ref 8.5–10.1)
CHLORIDE SERPL-SCNC: 105 MMOL/L (ref 94–109)
CO2 SERPL-SCNC: 27 MMOL/L (ref 20–32)
CREAT SERPL-MCNC: 1.08 MG/DL (ref 0.66–1.25)
D DIMER PPP FEU-MCNC: <0.3 UG/ML FEU (ref 0–0.5)
DIFFERENTIAL METHOD BLD: NORMAL
EOSINOPHIL # BLD AUTO: 0.1 10E9/L (ref 0–0.7)
EOSINOPHIL NFR BLD AUTO: 1.6 %
ERYTHROCYTE [DISTWIDTH] IN BLOOD BY AUTOMATED COUNT: 12.8 % (ref 10–15)
FLUAV RNA RESP QL NAA+PROBE: NEGATIVE
FLUBV RNA RESP QL NAA+PROBE: NEGATIVE
GFR SERPL CREATININE-BSD FRML MDRD: >90 ML/MIN/{1.73_M2}
GLUCOSE SERPL-MCNC: 86 MG/DL (ref 70–99)
HCT VFR BLD AUTO: 46.6 % (ref 40–53)
HGB BLD-MCNC: 15.1 G/DL (ref 13.3–17.7)
IMM GRANULOCYTES # BLD: 0 10E9/L (ref 0–0.4)
IMM GRANULOCYTES NFR BLD: 0.3 %
INTERPRETATION ECG - MUSE: NORMAL
LABORATORY COMMENT REPORT: NORMAL
LYMPHOCYTES # BLD AUTO: 2.4 10E9/L (ref 0.8–5.3)
LYMPHOCYTES NFR BLD AUTO: 34.3 %
MCH RBC QN AUTO: 27.7 PG (ref 26.5–33)
MCHC RBC AUTO-ENTMCNC: 32.4 G/DL (ref 31.5–36.5)
MCV RBC AUTO: 85 FL (ref 78–100)
MONOCYTES # BLD AUTO: 0.6 10E9/L (ref 0–1.3)
MONOCYTES NFR BLD AUTO: 8.8 %
NEUTROPHILS # BLD AUTO: 3.9 10E9/L (ref 1.6–8.3)
NEUTROPHILS NFR BLD AUTO: 54.6 %
NRBC # BLD AUTO: 0 10*3/UL
NRBC BLD AUTO-RTO: 0 /100
PLATELET # BLD AUTO: 251 10E9/L (ref 150–450)
POTASSIUM SERPL-SCNC: 3.8 MMOL/L (ref 3.4–5.3)
RBC # BLD AUTO: 5.46 10E12/L (ref 4.4–5.9)
RSV RNA SPEC QL NAA+PROBE: NORMAL
SARS-COV-2 RNA RESP QL NAA+PROBE: NEGATIVE
SODIUM SERPL-SCNC: 137 MMOL/L (ref 133–144)
SPECIMEN SOURCE: NORMAL
TROPONIN I SERPL-MCNC: <0.015 UG/L (ref 0–0.04)
WBC # BLD AUTO: 7.1 10E9/L (ref 4–11)

## 2021-01-14 PROCEDURE — 85025 COMPLETE CBC W/AUTO DIFF WBC: CPT | Performed by: EMERGENCY MEDICINE

## 2021-01-14 PROCEDURE — 99285 EMERGENCY DEPT VISIT HI MDM: CPT | Mod: 25

## 2021-01-14 PROCEDURE — 87636 SARSCOV2 & INF A&B AMP PRB: CPT | Performed by: EMERGENCY MEDICINE

## 2021-01-14 PROCEDURE — 84484 ASSAY OF TROPONIN QUANT: CPT | Performed by: EMERGENCY MEDICINE

## 2021-01-14 PROCEDURE — 93005 ELECTROCARDIOGRAM TRACING: CPT | Mod: 76

## 2021-01-14 PROCEDURE — 93005 ELECTROCARDIOGRAM TRACING: CPT

## 2021-01-14 PROCEDURE — 85379 FIBRIN DEGRADATION QUANT: CPT | Performed by: EMERGENCY MEDICINE

## 2021-01-14 PROCEDURE — 250N000011 HC RX IP 250 OP 636: Performed by: EMERGENCY MEDICINE

## 2021-01-14 PROCEDURE — C9803 HOPD COVID-19 SPEC COLLECT: HCPCS

## 2021-01-14 PROCEDURE — 71045 X-RAY EXAM CHEST 1 VIEW: CPT

## 2021-01-14 PROCEDURE — 96374 THER/PROPH/DIAG INJ IV PUSH: CPT

## 2021-01-14 PROCEDURE — 80048 BASIC METABOLIC PNL TOTAL CA: CPT | Performed by: EMERGENCY MEDICINE

## 2021-01-14 RX ORDER — KETOROLAC TROMETHAMINE 15 MG/ML
15 INJECTION, SOLUTION INTRAMUSCULAR; INTRAVENOUS ONCE
Status: COMPLETED | OUTPATIENT
Start: 2021-01-14 | End: 2021-01-14

## 2021-01-14 RX ADMIN — KETOROLAC TROMETHAMINE 15 MG: 15 INJECTION, SOLUTION INTRAMUSCULAR; INTRAVENOUS at 20:38

## 2021-01-14 ASSESSMENT — MIFFLIN-ST. JEOR: SCORE: 1770.93

## 2021-01-14 ASSESSMENT — ENCOUNTER SYMPTOMS
BRUISES/BLEEDS EASILY: 1
FEVER: 0
SHORTNESS OF BREATH: 1

## 2021-01-14 NOTE — ED AVS SNAPSHOT
[facility] Emergency Dept  [address] [facility] [address]  Phone: [phone]  Fax: [phone]                                    Denton Hines   MRN: [id number]    Department: [facility] Emergency Dept   Date of Visit: 1/14/2021           After Visit Summary Signature Page    I have received my discharge instructions, and my questions have been answered. I have discussed any challenges I see with this plan with the nurse or doctor.    ..........................................................................................................................................  Patient/Patient Representative Signature      ..........................................................................................................................................  Patient Representative Print Name and Relationship to Patient    ..................................................               ................................................  Date                                   Time    ..........................................................................................................................................  Reviewed by Signature/Title    ...................................................              ..............................................  Date                                               Time          22EPIC Rev 08/18

## 2021-01-15 NOTE — ED PROVIDER NOTES
History   Chief Complaint:  Chest Pain    HPI  Denton Hines is a 30 year old male, with a history of epilepsy, who presents to the ED for evaluation of chest pain. The patient reports a stabbing-like chest pain starting a month ago, that has continuously gotten worse over time. He states the pain is intermittent and non-radiating. Further, he is unsure of what all exacerbates the pain, but does note that exercise does make it worse. He was at  earlier today and had an EKG done. They were unsure of the cause so they sent him to the ED for further evaluation. Upon evaluation, he complains of shortness of breath. He also notes that he has been bruising more easily. He denies any fevers or leg swelling. Further, he denies drinking alcohol or any tobacco use.     Of note, we inquired if he wanted an  but he declined.    Allergies:  Lamotrigine    Medications:    Allegra  Albuterol  Mometasone    Past Medical History:    Epilepsy  Vitamin D deficiency  Tinnitus   Deviated nasal septum  Chronic rhinosinusitis    Past Surgical History:    Sinus surgery  Wrist surgery - right  Septoplasty - bilateral turbinates     Family History:    Hypertension  CAD  Breast cancer    Social History:  Marital Status:   Smoking status: No  Alcohol use: No  PCP: [facility]    Review of Systems   Constitutional: Negative for fever.   Respiratory: Positive for shortness of breath.    Cardiovascular: Positive for chest pain. Negative for leg swelling.   Hematological: Bruises/bleeds easily.   All other systems reviewed and are negative.      Physical Exam     Patient Vitals for the past 24 hrs:   BP Temp Temp src Pulse Resp SpO2 Height Weight   01/14/21 2100 114/72 -- -- 71 9 96 % -- --   01/14/21 2030 123/82 -- -- 75 10 96 % -- --   01/14/21 2000 136/68 -- -- 87 10 98 % -- --   01/14/21 1930 131/82 -- -- 81 -- -- -- --   01/14/21 1929 139/80 -- -- 72 12 99 % -- --   01/14/21 1852 (!) 159/87 97.6  F (36.4  C)  "Temporal 85 16 99 % 1.753 m (5' 9\") 82.6 kg (182 lb)       Physical Exam  General: Alert, interactive in mild distress  Head:  Scalp is atraumatic  Eyes:  The pupils are equal, round, and reactive to light    EOM's intact    No scleral icterus  ENT:      Nose:  The external nose is normal  Ears:  External ears are normal  Mouth/Throat: The oropharynx is normal        Neck:  Normal range of motion.      There is no rigidity.    Trachea is in the midline         CV:  Regular rate and rhythm    No murmur     No reproducible chest wall tenderness  Resp:  Breath sounds are clear bilaterally    Non-labored, no retractions or accessory muscle use      GI:  Abdomen is soft, no distension, no tenderness.       MS:  Normal strength in all 4 extremities  Skin:  Warm and dry, No rash or lesions noted.  Neuro: Strength 5/5 x4.  Sensation intact  In all 4 extremities.        Psych:  Awake. Alert.  Normal affect.      Appropriate interactions.    Emergency Department Course   ECG (18:53:46):  Rate 73 bpm. NV interval 162. QRS duration 100. QT/QTc 358/394. P-R-T axes 72 65 48. Normal sinus rhythm with sinus arrhythmia. Minimal voltage criteria for LVH, may be normal variant. Borderline ECG. Interpreted at 1922 by Mike Dykes MD.    ECG (20:31:48):  Rate 66 bpm. NV interval 178. QRS duration 98. QT/QTc 380/398. P-R-T axes 57 53 46. Normal sinus rhythm with sinus arrhythmia. Early repolarization. Normal ECG. Interpreted at 2035 by Mike Dykes MD.    Imaging:  Radiology findings were communicated with the patient who voiced understanding of the findings.    XR Chest, portable, 1 view:  No acute cardiopulmonary disease.    Reading per radiology    Laboratory:  Laboratory findings were communicated with the patient who voiced understanding of the findings.    CBC: WBC: 7.1, HGB: 15.1, PLT: 251    BMP: WNL (Creatinine 1.08)    Troponin (Collected 1929): <0.015    D Dimer: <0.3    Symptomatic COVID-19 Virus PCR: " Pending     Symptomatic Influenza A/B: Pending    Emergency Department Course:    Reviewed:  I reviewed the patient's nursing notes, vitals, past medical records, Care Everywhere.     Assessments:  1923 I first assessed the patient and performed an exam.    2119 I rechecked the patient and discussed results. Discussed plans for discharge.    Interventions:  2038: Toradol 15 mg IV    Disposition:  The patient was discharged to home.       Impression & Plan    Covid-19  Denton Hines was evaluated during a global COVID-19 pandemic, which necessitated consideration that the patient might be at risk for infection with the SARS-CoV-2 virus that causes COVID-19.   Applicable protocols for evaluation were followed during the patient's care.   COVID-19 was considered as part of the patient's evaluation. The plan for testing is:  a test was obtained during this visit.    Medical Decision Making:   Denton Hines is a 30 year old male who was seen and evaluated. He has had approximately 1 month of intermittent left sided sharp chest pain. I think this is likely related to either pleurisy or precordial catch syndrome. There are no findings to suggest acute coronary syndrome. With a negative D dimer and no risk factors, I doubt pulmonary embolism. Findings are not consistent with aortic dissection. Laboratory workup was reassuring. There are no signs of musculoskeletal injury, pneumonia, pneumothorax. I recommended tylenol and ibuprofen. Follow up with his primary care provider. I don't believe he needs any further work up at this time. Patient was informed of all the findings, who was in agreement with this plan. He does have mild cough and Covid testing is pending. He does not have any other symptoms.     Diagnosis:     ICD-10-CM    1. Chest pain, unspecified type  R07.9        Disposition:   Discharged to home.    Discharge Medications:  New Prescriptions    No medications on file        Scribe Disclosure:  I,  Beatris Chauhan, am serving as a scribe on 1/14/2021 at 7:23 PM to personally document services performed by Mike Dykes MD, based on my observations and the provider's statements to me.           Mike Dykes MD  01/14/21 7460

## 2021-01-15 NOTE — ED TRIAGE NOTES
Stabbing chest pain off and on for over a month. Reports episodes seem random. Don't increase with activity. Today pain was worse, went to UC and they sent pt here for further evaluation.

## 2021-04-24 ENCOUNTER — HEALTH MAINTENANCE LETTER (OUTPATIENT)
Age: 31
End: 2021-04-24

## 2021-08-17 ENCOUNTER — TELEPHONE (OUTPATIENT)
Dept: UROLOGY | Facility: CLINIC | Age: 31
End: 2021-08-17

## 2021-08-17 DIAGNOSIS — Z31.41 FERTILITY TESTING: Primary | ICD-10-CM

## 2021-09-15 ENCOUNTER — PRE VISIT (OUTPATIENT)
Dept: UROLOGY | Facility: CLINIC | Age: 31
End: 2021-09-15

## 2021-09-15 NOTE — TELEPHONE ENCOUNTER
MEDICAL RECORDS REQUEST   [facility]  Urology Clinic  [address] [place] [address]  PHONE: [phone]  Fax: [phone]        FUTURE VISIT INFORMATION                                                   Denton Hines, : 1990 scheduled for future visit at [facility]    APPOINTMENT INFORMATION:    Date: 2021    Provider:  Jasbir Carmona MD    Reason for Visit/Diagnosis: infertility consult    REFERRAL INFORMATION:    Referring provider:  Vazquez    Specialty: N/A    Referring providers clinic:  [facility]    Clinic contact number:  N/A    RECORDS REQUESTED FOR VISIT                                                     NOTES  STATUS/DETAILS   OFFICE NOTE from referring provider  no   OFFICE NOTE from other specialist  no   DISCHARGE SUMMARY from hospital  no   DISCHARGE REPORT from the ER  no   OPERATIVE REPORT  no   MEDICATION LIST  yes   LABS     URINALYSIS (UA)  yes   URINE CYTOLOGY  no   INFERTILITY     ALBUMIN  no   FSH  no   LAST UROLOGY/OB GYN VISIT NOTE  no   LH  no   SEMEN ANALYSIS (LAST 2)  no   SHBG  no   T  no     PRE-VISIT CHECKLIST      Record collection complete Yes   Appointment appropriately scheduled           (right time/right provider) Yes   Joint diagnostic appointment coordinated correctly          (ensure right order & amount of time) Yes   MyChart activation Yes   Questionnaire complete If no, please explain pending

## 2021-09-22 ENCOUNTER — PRE VISIT (OUTPATIENT)
Dept: UROLOGY | Facility: CLINIC | Age: 31
End: 2021-09-22

## 2021-09-22 NOTE — TELEPHONE ENCOUNTER
Reason for visit: Consult     Relevant information: infertility    Records/imaging/labs/orders: in Epic; SA ordered    Pt called: no    At Rooming: normal

## 2021-09-23 ENCOUNTER — E-VISIT (OUTPATIENT)
Dept: URGENT CARE | Facility: URGENT CARE | Age: 31
End: 2021-09-23

## 2021-09-23 ENCOUNTER — TELEPHONE (OUTPATIENT)
Dept: DERMATOLOGY | Facility: CLINIC | Age: 31
End: 2021-09-23

## 2021-09-23 DIAGNOSIS — L30.9 DERMATITIS: Primary | ICD-10-CM

## 2021-09-23 PROCEDURE — 99421 OL DIG E/M SVC 5-10 MIN: CPT | Performed by: FAMILY MEDICINE

## 2021-09-23 RX ORDER — DESONIDE 0.5 MG/G
CREAM TOPICAL 2 TIMES DAILY
Qty: 15 G | Refills: 0 | Status: SHIPPED | OUTPATIENT
Start: 2021-09-23

## 2021-09-23 NOTE — TELEPHONE ENCOUNTER
[facility] Call Center    Phone Message    May a detailed message be left on voicemail: yes     Reason for Call: Appointment Intake    Referring Provider Name: NA  Diagnosis and/or Symptoms: Pt has been waiting for his Appt for video for about an hour now and is frustrated. Pt would like to get his Appt in. I called back line and no one answered. Please Follow-up thank you    Action Taken: Message routed to:  [facility] Derm    Travel Screening: Not Applicable

## 2021-09-24 ENCOUNTER — MYC MEDICAL ADVICE (OUTPATIENT)
Dept: DERMATOLOGY | Facility: CLINIC | Age: 31
End: 2021-09-24

## 2021-09-24 ENCOUNTER — TELEPHONE (OUTPATIENT)
Dept: DERMATOLOGY | Facility: CLINIC | Age: 31
End: 2021-09-24

## 2021-09-24 NOTE — PATIENT INSTRUCTIONS
Dear Denton Hines    Skin conditions can be hard to diagnose over pictures. Your rash looks like it may be seborrheic dermatitis. I sent in a cream to help with this    Based on your responses, I have prescribed desonide to treat this. Please follow the instructions on the medication. If you experience irritation of your skin, new rash, or any other new symptoms, you should stop using this medication and contact your primary care provider or skin specialist    If this treatment does not work for you or you will run out of refills, please plan to follow- up with your primary care provider to set refills for a longer period of time or to try other options.     Thanks for choosing us as your health care partner,    Kapil Nova MD

## 2021-09-24 NOTE — TELEPHONE ENCOUNTER
Patient completed e-visit w/ Dr. Kapil Nova @ [facility].    TD Vance-BSN-PHN  [facility]  [phone]

## 2021-09-24 NOTE — TELEPHONE ENCOUNTER
[facility] Call Center    Phone Message    May a detailed message be left on voicemail: yes     Reason for Call: Other: Pt called about missed Appt Yesterday 9/23/21 Virtual , he would like it to be rescheduled my Next Available isn't until 10/21 and he would like someone from the clinic to contact him, Please call pt back to discuss  Thank you,    Action Taken: Message routed to:  [facility] derm    Travel Screening: Not Applicable

## 2021-09-27 NOTE — TELEPHONE ENCOUNTER
Called and spoke to patient.    Scheduled next available appointment for spreading rash- v/v 10/28/21 w/ Dr. Haile.    Advised patient to go to UC/ER if rash worsens, trouble swallowing, or SOB.    Patient voiced understanding.    Rajiv RN-BSN-PHN  [facility]  [phone]

## 2021-09-27 NOTE — TELEPHONE ENCOUNTER
Called and spoke to patient.    Scheduled next available appointment for spreading rash- v/v 10/28/21 w/ Dr. Haile.    Advised patient to go to UC/ER if rash worsens, trouble swallowing, or SOB.    Patient voiced understanding.    Rajiv RN-BSN-PHN  [facility]  [phone]

## 2021-10-03 ENCOUNTER — HEALTH MAINTENANCE LETTER (OUTPATIENT)
Age: 31
End: 2021-10-03

## 2021-10-05 ENCOUNTER — PRE VISIT (OUTPATIENT)
Dept: UROLOGY | Facility: CLINIC | Age: 31
End: 2021-10-05

## 2021-10-15 ENCOUNTER — LAB (OUTPATIENT)
Dept: LAB | Facility: CLINIC | Age: 31
End: 2021-10-15
Attending: UROLOGY
Payer: COMMERCIAL

## 2021-10-15 DIAGNOSIS — Z31.41 FERTILITY TESTING: ICD-10-CM

## 2021-10-15 PROCEDURE — 89322 SEMEN ANAL STRICT CRITERIA: CPT

## 2021-10-18 LAB
ABNORMAL SPERM MORPHOLOGY: 95
ABSTINENCE DAYS: 4 DAYS (ref 2–7)
AGGLUTINATION: NO
ANALYSIS TEMP - CENTIGRADE: 22 CENTIGRADE
COLLECTION METHOD: NORMAL
COLLECTION SITE: NORMAL
CONSENT TO RELEASE TO PARTNER: NO
DAL- RECEIVED TIME: NORMAL
HEAD DEFECT: 95 %
IMMOTILE: 40 %
LIQUEFIED: YES
MIDPIECE DEFECT: 29 %
NON-PROGRESSIVE MOTILITY: 10 %
NORMAL SPERM MORPHOLOGY: 5 % NORMAL FORMS
PROGRESSIVE MOTILITY: 50 %
ROUND CELLS: <0.1 MILLION/ML
SPECIMEN PH: 7.6 PH
SPECIMEN VOLUME: 2.8 ML
SPERM CONCENTRATION: 108 MILLION/ML
TAIL DEFECT: 1 %
TIME OF ANALYSIS: NORMAL
TOTAL PROGRESSIVE MOTILE NUMBER: 151 MILLION
TOTAL SPERM NUMBER: 302 MILLION
VISCOUS: NO
VITALITY: NORMAL

## 2021-10-21 ENCOUNTER — VIRTUAL VISIT (OUTPATIENT)
Dept: UROLOGY | Facility: CLINIC | Age: 31
End: 2021-10-21
Payer: COMMERCIAL

## 2021-10-21 VITALS — BODY MASS INDEX: 25.14 KG/M2 | WEIGHT: 169.75 LBS | HEIGHT: 69 IN

## 2021-10-21 DIAGNOSIS — Z31.41 FERTILITY TESTING: Primary | ICD-10-CM

## 2021-10-21 PROCEDURE — 99213 OFFICE O/P EST LOW 20 MIN: CPT | Mod: 95 | Performed by: UROLOGY

## 2021-10-21 ASSESSMENT — PAIN SCALES - GENERAL: PAINLEVEL: NO PAIN (0)

## 2021-10-21 ASSESSMENT — MIFFLIN-ST. JEOR: SCORE: 1710.38

## 2021-10-21 NOTE — PROGRESS NOTES
Denton is a 31 year old who is being evaluated via a billable video visit.      Video Visit Technology for this patient: Medipacs Video Visit- Patient was left in waiting room    How would you like to obtain your AVS? MyChart  If the video visit is dropped, the invitation should be resent by: Text to cell phone: [phone]  Will anyone else be joining your video visit? No      Video Start Time: 1:05 PM  Video-Visit Details    Type of service:  Video Visit    Video End Time:1:20 PM    Originating Location (pt. Location): Home    Distant Location (provider location):  [facility] [place]     Platform used for Video Visit: Medipacs      It was my pleasure to see Mr. Denton Hines, a 31 year old male here in consultation today for fertility evaluation.  His spouse is Laisha Dumont age 29 (7/16/92).    This couple has been attempting to conceive for the last year to 1.5 years. They have no previous pregnancy together.  Pregnancies with other partners: none.  They have tried timed intercourse. They have not tried IUI or IVF.    Female factors suspected: Her cycles are irregular, but she has not yet been formally evaluated.      PAST MEDICAL HISTORY:    Past Medical History:   Diagnosis Date     Epilepsy (H)      Nerve injury     right motor nerve injury     Seizures (H)     9-30-15 off medications, no episodes for 6 months    History of testis trauma as a child- severe pain x 2 weeks.  Very young at this time.      PAST SURG HISTORY  Past Surgical History:   Procedure Laterality Date     RELEASE CARPAL TUNNEL Right 10/22/2015    Procedure: RELEASE CARPAL TUNNEL;  Surgeon: Ana Ruvalcaba MD;  [facility]     REPAIR NERVE MICROSCOPIC UPPER EXTREMITY Right 10/22/2015    Procedure: REPAIR NERVE MICROSCOPIC UPPER EXTREMITY;  Surgeon: Ana Ruvalcaba MD;  [facility]     SINUS SURGERY      history of epilepsy age 5, seizure medications stopped many years ago.  He has moreso migraines  now.    Medications as of 10/21/2021:  Current Outpatient Medications   Medication Sig     desonide (DESOWEN) 0.05 % external cream Apply topically 2 times daily     Multiple Vitamins-Minerals (MULTIVITAMIN PO) Take by mouth daily     No current facility-administered medications for this visit.    No prescription medications at this time.    ALLERGY:     Allergies   Allergen Reactions     Lamotrigine      Other reaction(s): Unknown     Mold Other (See Comments)      Grass and pollen , Dog , cats      Seasonal Allergies        SOCIAL HISTORY:   . Occupation: car sales in past,  food delivery.  No alcohol use, no tobacco use.   Social History     Tobacco Use     Smoking status: Never Smoker     Smokeless tobacco: Never Used   Substance Use Topics     Alcohol use: No     Drug use: No       REVIEW OF SYSTEMS:  Significant for feeling well at present, other than face skin rash.    Denies erectile dysfunction, ejaculatory problems, testicular pain. No vision or smell deficits, no chronic sinus or respiratory infections. No recent febrile illness, weight loss. No heat or cold intolerance, gynecomastia, or other endocrine complaints.    Otherwise, no constitutional, eye, ENT, heart, lung, GI , musculoskeletal, skin, neurologic, psychiatric, or hematologic complaints.    GONADOTOXIN EXPOSURE: Unremarkable. Otherwise negative for marijuana, heat, chemicals, pesticides, heavy metals, steroids, chemotherapy or radiation.    GENERAL PHYSICAL EXAM  Exam  General- Alert, oriented, nad.  Pleasant and conversant.  Eyes- anicteric, EOMI.  Resps- normal, non-labored.  No cough  Abdomen-  nondistended.   exam- deferred.   Neurological - no tremors  Skin - no discoloration/ lesions noted  Psychiatric - no anxiety, alert & oriented.      The rest of a comprehensive physical examination is deferred due to video visit restrictions.    Labs/imaging reviewed by me today:    ASSESSMENT:    Fertility Testing.    Normal semen analysis  results x1    PLAN:    Hormonal panel , repeat semen analysis, and physical exam discussed, but I recommended his wife get evaluated prior to doing more extensive testing on the male side since his initial screening SA was normal    Discussed OTC supplement to consider taking    PRN follow-up with urology.       Jasbir Carmona MD        Additional Coding Information:    Problems:  3 -- one acute uncomplicated illness or injury    Data Reviewed  Review of the result(s) of each unique test - Semen analysis x1    Tests ordered/pending: None    Notes from other providers reviewed: None    Level of risk:  3 -- low risk (e.g., OTC medication or observation, minor surgery without risks)    Time spent:  20 minutes spent on the date of the encounter doing chart review, history and exam, documentation and further activities per the note.  This includes the video call time above.

## 2021-10-21 NOTE — NURSING NOTE
"Chief Complaint   Patient presents with     Consult     infertility       Height 1.753 m (5' 9\"), weight 77 kg (169 lb 12.1 oz). Body mass index is 25.07 kg/m .    Patient Active Problem List   Diagnosis     Shoulder pain     Muscle pain     Complex regional pain syndrome type 2 of right upper extremity       Allergies   Allergen Reactions     Lamotrigine      Other reaction(s): Unknown     Mold Other (See Comments)      Grass and pollen , Dog , cats      Seasonal Allergies        Current Outpatient Medications   Medication Sig Dispense Refill     desonide (DESOWEN) 0.05 % external cream Apply topically 2 times daily 15 g 0     Multiple Vitamins-Minerals (MULTIVITAMIN PO) Take by mouth daily         Social History     Tobacco Use     Smoking status: Never Smoker     Smokeless tobacco: Never Used   Substance Use Topics     Alcohol use: No     Drug use: No       Giovanny Ennis EMT  10/21/2021  12:42 PM  "

## 2021-10-21 NOTE — LETTER
10/21/2021       RE: Denton Hines  [address]  [facility] [address]     Dear Colleague,    Thank you for referring your patient, Denton Hines, to the [facility] [place] at [facility]. Please see a copy of my visit note below.    Denton is a 31 year old who is being evaluated via a billable video visit.      Video Visit Technology for this patient: Jiglu Video Visit- Patient was left in waiting room    How would you like to obtain your AVS? MyChart  If the video visit is dropped, the invitation should be resent by: Text to cell phone: [phone]  Will anyone else be joining your video visit? No      Video Start Time: 1:05 PM  Video-Visit Details    Type of service:  Video Visit    Video End Time:1:20 PM    Originating Location (pt. Location): Home    Distant Location (provider location):  [facility]     Platform used for Video Visit: Jiglu      It was my pleasure to see Mr. Denton Hines, a 31 year old male here in consultation today for fertility evaluation.  His spouse is Laisha Dumont age 29 (7/16/92).    This couple has been attempting to conceive for the last year to 1.5 years. They have no previous pregnancy together.  Pregnancies with other partners: none.  They have tried timed intercourse. They have not tried IUI or IVF.    Female factors suspected: Her cycles are irregular, but she has not yet been formally evaluated.      PAST MEDICAL HISTORY:    Past Medical History:   Diagnosis Date     Epilepsy (H)      Nerve injury     right motor nerve injury     Seizures (H)     9-30-15 off medications, no episodes for 6 months    History of testis trauma as a child- severe pain x 2 weeks.  Very young at this time.      PAST SURG HISTORY  Past Surgical History:   Procedure Laterality Date     RELEASE CARPAL TUNNEL Right 10/22/2015    Procedure: RELEASE CARPAL TUNNEL;  Surgeon:  Ana Ruvalcaba MD;  [facility]     REPAIR NERVE MICROSCOPIC UPPER EXTREMITY Right 10/22/2015    Procedure: REPAIR NERVE MICROSCOPIC UPPER EXTREMITY;  Surgeon: Ana Ruvalcaba MD;  [facility]     SINUS SURGERY      history of epilepsy age 5, seizure medications stopped many years ago.  He has moreso migraines now.    Medications as of 10/21/2021:  Current Outpatient Medications   Medication Sig     desonide (DESOWEN) 0.05 % external cream Apply topically 2 times daily     Multiple Vitamins-Minerals (MULTIVITAMIN PO) Take by mouth daily     No current facility-administered medications for this visit.    No prescription medications at this time.    ALLERGY:     Allergies   Allergen Reactions     Lamotrigine      Other reaction(s): Unknown     Mold Other (See Comments)      Grass and pollen , Dog , cats      Seasonal Allergies        SOCIAL HISTORY:   . Occupation: car sales in past,  food delivery.  No alcohol use, no tobacco use.   Social History     Tobacco Use     Smoking status: Never Smoker     Smokeless tobacco: Never Used   Substance Use Topics     Alcohol use: No     Drug use: No       REVIEW OF SYSTEMS:  Significant for feeling well at present, other than face skin rash.    Denies erectile dysfunction, ejaculatory problems, testicular pain. No vision or smell deficits, no chronic sinus or respiratory infections. No recent febrile illness, weight loss. No heat or cold intolerance, gynecomastia, or other endocrine complaints.    Otherwise, no constitutional, eye, ENT, heart, lung, GI , musculoskeletal, skin, neurologic, psychiatric, or hematologic complaints.    GONADOTOXIN EXPOSURE: Unremarkable. Otherwise negative for marijuana, heat, chemicals, pesticides, heavy metals, steroids, chemotherapy or radiation.    GENERAL PHYSICAL EXAM  Exam  General- Alert, oriented, nad.  Pleasant and conversant.  Eyes- anicteric, EOMI.  Resps- normal, non-labored.  No cough  Abdomen-   nondistended.   exam- deferred.   Neurological - no tremors  Skin - no discoloration/ lesions noted  Psychiatric - no anxiety, alert & oriented.      The rest of a comprehensive physical examination is deferred due to video visit restrictions.    Labs/imaging reviewed by me today:    ASSESSMENT:    Fertility Testing.    Normal semen analysis results x1    PLAN:    Hormonal panel , repeat semen analysis, and physical exam discussed, but I recommended his wife get evaluated prior to doing more extensive testing on the male side since his initial screening SA was normal    Discussed OTC supplement to consider taking    PRN follow-up with urology.       Jasbir Carmona MD        Additional Coding Information:    Problems:  3 -- one acute uncomplicated illness or injury    Data Reviewed  Review of the result(s) of each unique test - Semen analysis x1    Tests ordered/pending: None    Notes from other providers reviewed: None    Level of risk:  3 -- low risk (e.g., OTC medication or observation, minor surgery without risks)    Time spent:  20 minutes spent on the date of the encounter doing chart review, history and exam, documentation and further activities per the note.  This includes the video call time above.    Again, thank you for allowing me to participate in the care of your patient.      Sincerely,    Jasbir Carmona MD

## 2022-05-15 ENCOUNTER — HEALTH MAINTENANCE LETTER (OUTPATIENT)
Age: 32
End: 2022-05-15

## 2022-08-01 NOTE — TELEPHONE ENCOUNTER
DIAGNOSIS: left shoulder    APPOINTMENT DATE: 09/08/2022   NOTES STATUS DETAILS   OFFICE NOTE from referring provider Care Everywhere 06/06/2022 [facility]   OFFICE NOTE from other specialist Care Everywhere 07/26/2022 Chiropractic care     DISCHARGE SUMMARY from hospital N/A    DISCHARGE REPORT from the ER N/A    OPERATIVE REPORT N/A    MEDICATION LIST N/A    EMG (for Spine) N/A    IMPLANT RECORD/STICKER N/A    LABS     CBC/DIFF N/A    CULTURES N/A    INJECTIONS DONE IN RADIOLOGY N/A    MRI Received 07/11/2022 LFT shoulder   CT SCAN N/A    XRAYS (IMAGES & REPORTS) Received 06/06/2022 LFT shoulder   TUMOR     PATHOLOGY  Slides & report N/A      Images in PACS

## 2022-09-08 ENCOUNTER — PRE VISIT (OUTPATIENT)
Dept: ORTHOPEDICS | Facility: CLINIC | Age: 32
End: 2022-09-08

## 2022-09-11 ENCOUNTER — HEALTH MAINTENANCE LETTER (OUTPATIENT)
Age: 32
End: 2022-09-11

## 2023-04-30 ENCOUNTER — HEALTH MAINTENANCE LETTER (OUTPATIENT)
Age: 33
End: 2023-04-30

## 2023-08-30 NOTE — TELEPHONE ENCOUNTER
Jennifer calling on behalf of  Michael James.  Wanting to speak to Lulu Fields directly in regards to narrative request.   He's wanting to set up a telephone meeting.     His direct number is [phone].    Gisella RUANO    [facility] [place]     Hydroxychloroquine Pregnancy And Lactation Text: This medication has been shown to cause fetal harm but it isn't assigned a Pregnancy Risk Category. There are small amounts excreted in breast milk.

## 2024-07-07 ENCOUNTER — HEALTH MAINTENANCE LETTER (OUTPATIENT)
Age: 34
End: 2024-07-07